# Patient Record
Sex: MALE | Race: WHITE | NOT HISPANIC OR LATINO | Employment: FULL TIME | URBAN - METROPOLITAN AREA
[De-identification: names, ages, dates, MRNs, and addresses within clinical notes are randomized per-mention and may not be internally consistent; named-entity substitution may affect disease eponyms.]

---

## 2017-01-11 ENCOUNTER — TELEPHONE (OUTPATIENT)
Dept: PAIN MEDICINE | Facility: CLINIC | Age: 42
End: 2017-01-11

## 2017-01-12 ENCOUNTER — OFFICE VISIT (OUTPATIENT)
Dept: PAIN MEDICINE | Facility: CLINIC | Age: 42
End: 2017-01-12
Payer: COMMERCIAL

## 2017-01-12 VITALS
SYSTOLIC BLOOD PRESSURE: 156 MMHG | HEART RATE: 67 BPM | WEIGHT: 217.38 LBS | BODY MASS INDEX: 30.43 KG/M2 | RESPIRATION RATE: 18 BRPM | HEIGHT: 71 IN | TEMPERATURE: 98 F | DIASTOLIC BLOOD PRESSURE: 108 MMHG

## 2017-01-12 DIAGNOSIS — F33.1 MODERATE EPISODE OF RECURRENT MAJOR DEPRESSIVE DISORDER: Primary | ICD-10-CM

## 2017-01-12 PROCEDURE — 1159F MED LIST DOCD IN RCRD: CPT | Mod: S$GLB,,, | Performed by: PSYCHIATRY & NEUROLOGY

## 2017-01-12 PROCEDURE — 3080F DIAST BP >= 90 MM HG: CPT | Mod: S$GLB,,, | Performed by: PSYCHIATRY & NEUROLOGY

## 2017-01-12 PROCEDURE — 99204 OFFICE O/P NEW MOD 45 MIN: CPT | Mod: S$GLB,,, | Performed by: PSYCHIATRY & NEUROLOGY

## 2017-01-12 PROCEDURE — 3077F SYST BP >= 140 MM HG: CPT | Mod: S$GLB,,, | Performed by: PSYCHIATRY & NEUROLOGY

## 2017-01-12 PROCEDURE — 99999 PR PBB SHADOW E&M-EST. PATIENT-LVL III: CPT | Mod: PBBFAC,,, | Performed by: PSYCHIATRY & NEUROLOGY

## 2017-01-12 RX ORDER — ESCITALOPRAM OXALATE 10 MG/1
10 TABLET ORAL DAILY
Qty: 30 TABLET | Refills: 1 | Status: SHIPPED | OUTPATIENT
Start: 2017-01-12 | End: 2017-02-18 | Stop reason: SDUPTHER

## 2017-01-12 NOTE — MR AVS SNAPSHOT
[facility]  [address] [place] [address]  Phone: [phone]  Fax: [phone]                  Slava Kamara   2017 12:00 PM   Office Visit    Description:  Male : 1975   Provider:  Dariana Chicas MD   Department:  [facility]           Reason for Visit     Anxiety           Diagnoses this Visit        Comments    Moderate episode of recurrent major depressive disorder    -  Primary            To Do List           Future Appointments        Provider Department Dept Phone    2017 9:20 AM Dariana Chicas MD [facility] [phone]      Goals (5 Years of Data)     None       These Medications        Disp Refills Start End    escitalopram oxalate (LEXAPRO) 10 MG tablet 30 tablet 1 2017    Take 1 tablet (10 mg total) by mouth once daily. - Oral    Pharmacy: [facility]/pharmacy #[id number] - [place], [place] - [address] Ph #: [phone]         [facility] On Call     [facility] On Call Nurse Care Line -  Assistance  Registered nurses in the [facility] On Call Center provide clinical advisement, health education, appointment booking, and other advisory services.  Call for this free service at [phone].             Medications           Message regarding Medications     Verify the changes and/or additions to your medication regime listed below are the same as discussed with your clinician today.  If any of these changes or additions are incorrect, please notify your healthcare provider.        START taking these NEW medications        Refills    escitalopram oxalate (LEXAPRO) 10 MG tablet 1    Sig: Take 1 tablet (10 mg total) by mouth once daily.    Class: Normal    Route: Oral           Verify that the below list of medications is an accurate representation of the medications you are currently taking.  If none reported, the list may be blank. If incorrect, please contact your healthcare provider.  "Carry this list with you in case of emergency.           Current Medications     hydrocodone-acetaminophen 5-325mg (NORCO) 5-325 mg per tablet Take 1 tablet by mouth every 6 (six) hours as needed for Pain.    ondansetron (ZOFRAN-ODT) 8 MG TbDL Take 1 tablet (8 mg total) by mouth every 8 (eight) hours as needed.    oxybutynin (DITROPAN-XL) 10 MG 24 hr tablet Take 1 tablet (10 mg total) by mouth once daily.    amlodipine (NORVASC) 10 MG tablet Take 1 tablet (10 mg total) by mouth once daily.    epinephrine (EPIPEN) 0.3 mg/0.3 mL (1:1,000) AtIn Inject 0.3 mLs (0.3 mg total) into the muscle once.    escitalopram oxalate (LEXAPRO) 10 MG tablet Take 1 tablet (10 mg total) by mouth once daily.           Clinical Reference Information           Vital Signs - Last Recorded  Most recent update: 1/12/2017 12:41 PM by Zeyad Roldan MA    BP Pulse Temp Resp Ht Wt    (!) 156/108 (BP Location: Left arm, Patient Position: Sitting, BP Method: Automatic) 67 97.7 °F (36.5 °C) (Oral) 18 5' 11" (1.803 m) 98.6 kg (217 lb 6 oz)    BMI                30.32 kg/m2          Blood Pressure          Most Recent Value    BP  (!)  156/108      Allergies as of 1/12/2017     Sulfa (Sulfonamide Antibiotics)    Bee Sting [Allergen Ext-venom-honey Bee]      Immunizations Administered on Date of Encounter - 1/12/2017     None      Orders Placed During Today's Visit      Normal Orders This Visit    Ambulatory consult to Psychology       "

## 2017-01-12 NOTE — PROGRESS NOTES
"Outpatient Psychiatry Initial Visit (MD/NP)    1/12/2017    Slava Kamara, a 41 y.o. male, presenting for initial evaluation visit. Met with patient.    Reason for Encounter: self-referral. Patient complains of irritability, anger.        History of Present Illness: Anxiety  Patient is here for evaluation of anxiety.  He has the following anxiety symptoms: difficulty concentrating, feelings of losing control, insomnia, irritable, psychomotor agitation, racing thoughts, shortness of breath. Onset of symptoms was approximately several years ago.  Symptoms have been gradually worsening since that time. He denies current suicidal and homicidal ideation. Family history significant for anxiety.Possible organic causes contributing are: none. Risk factors: negative life event recent move, new job Previous treatment includes none.   He complains of the following medication side effects: none.    Depression  Patient complains of depression. He complains of depressed mood, difficulty concentrating, fatigue, feelings of worthlessness/guilt, insomnia and psychomotor agitation. Onset was approximately in 2007 . That he stayed depressed for a year or moreSymptoms have been gradually worsening since that time. Current symptoms include: depressed mood, difficulty concentrating, fatigue, feelings of worthlessness/guilt and psychomotor agitation. Patient denies recurrent thoughts of death, suicidal attempt, suicidal thoughts with specific plan and suicidal thoughts without plan. Family history significant for anxiety. Possible organic causes contributing are: none. Risk factors: negative life event new job. Previous treatment includes none. He complains of the following side effects from the treatment: none.         Pt reports that he was [place] in [place]. That he grew up with an older sister. That he was raised by both parents till age 8yrs. That his parents then got . That till the age of 8 things went " I think well", " "that they had what they needed, both parents worked. That he does not recall them fighting or quarreling. That when they told him that they were getting  he was angry and surprised. That That his mom had custody and they spent the weekend with their father. That their parents did not seem to have any issues with the divorce. That his mother got remarried 8 moths after the divorce. That his step father was "OK" and they got along well. That he did not do well academically in HS, he was a C , D student. That That he was in [employer]. That he had few friends. That he enjoyed and did well in the  Environment. That he did really well with participating and winning competitions that they enrolled. He completed HS there. That he got  at the age of 16 yrs, had known his gf for 6 months only. That he joined the [employer] in 1994 and moved to [place]. That his twin ( boy and girl) children were born in 1996. That they had problems with their parental styles and that got them arguing them a lot. That they finalized the divorce in 2000. That he signed parental rights over to his wife in 2001. That she got  the same year. That he remarried in July 2002.His wife is 10 yrs older and she has 3 children form prior marriages. 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 That he moved here in 2015 after his wife got a job opportunity. That they live together, and are supportive towards each other. That they no children together.    Review Of Systems:     GENERAL:  No weight gain or loss  SKIN:  No rashes or lacerations  HEAD:  No headaches  EYES:  No exophthalmos, jaundice or blindness  EARS:  No dizziness, tinnitus or hearing loss  NOSE:  No changes in smell  MOUTH & THROAT:  No dyskinetic movements or obvious goiter  CHEST:  No shortness of breath, hyperventilation or cough  CARDIOVASCULAR:  No tachycardia or chest pain  ABDOMEN:  No nausea, vomiting, pain, constipation or " diarrhea  URINARY:  No frequency, dysuria or sexual dysfunction  ENDOCRINE:  No polydipsia, polyuria  MUSCULOSKELETAL:  No pain or stiffness of the joints  NEUROLOGIC:  No weakness, sensory changes, seizures, confusion, memory loss, tremor or other abnormal movements    Current Evaluation:     Nutritional Screening: Considering the patient's height and weight, medications, medical history and preferences, should a referral be made to the dietitian? no    Constitutional  Vitals:  Most recent vital signs, dated less than 90 days prior to this appointment, were reviewed.    There were no vitals filed for this visit.     General:  age appropriate, casually dressed, neatly groomed     Musculoskeletal  Muscle Strength/Tone:  no tremor, no tic   Gait & Station:  non-ataxic     Psychiatric  Speech:  no latency; no press   Mood & Affect:  anxious, depressed, irritable  congruent and appropriate   Thought Process:  normal and logical, goal-directed   Associations:  intact   Thought Content:  normal, no suicidality, no homicidality, delusions, or paranoia   Insight:  intact   Judgement: behavior is adequate to circumstances   Orientation:  grossly intact   Memory: intact for content of interview   Language: grossly intact   Attention Span & Concentration:  able to focus   Fund of Knowledge:  intact and appropriate to age and level of education       Relevant Elements of Neurological Exam: normal gait    Functioning in Relationships:  Spouse/partner:   Peers: few  Employers: [employer]    Laboratory Data  No visits with results within 1 Month(s) from this visit.  Latest known visit with results is:    Office Visit on 10/10/2016   Component Date Value Ref Range Status    Color, UA 10/12/2016 YELLOW   Final    Spec Grav, UA 10/12/2016 1.005   Final    pH, UA 10/12/2016 8   Final    WBC, UA 10/12/2016 ++   Final    Nitrite, UA 10/12/2016 N   Final    Protein, UA 10/12/2016 N   Final    Glucose, UA 10/12/2016 N   Final     Ketones, UA 10/12/2016 N   Final    Urobilinogen, UA 10/12/2016 N   Final    Bilirubin, UA 10/12/2016 N   Final    Blood, UA 10/12/2016 250   Final    Urine Culture, Routine 10/10/2016 No growth   Final         Medications  Outpatient Encounter Prescriptions as of 1/12/2017   Medication Sig Dispense Refill    amlodipine (NORVASC) 10 MG tablet Take 1 tablet (10 mg total) by mouth once daily. 30 tablet 11    epinephrine (EPIPEN) 0.3 mg/0.3 mL (1:1,000) AtIn Inject 0.3 mLs (0.3 mg total) into the muscle once. 0.3 mL 2    hydrocodone-acetaminophen 5-325mg (NORCO) 5-325 mg per tablet Take 1 tablet by mouth every 6 (six) hours as needed for Pain. 20 tablet 0    ondansetron (ZOFRAN-ODT) 8 MG TbDL Take 1 tablet (8 mg total) by mouth every 8 (eight) hours as needed. 12 tablet 1    oxybutynin (DITROPAN-XL) 10 MG 24 hr tablet Take 1 tablet (10 mg total) by mouth once daily. 30 tablet 11     No facility-administered encounter medications on file as of 1/12/2017.            Assessment - Diagnosis - Goals:     Impression: Pt is a 42 Y/O CM with PMHx sig for Hypertension with    Generalized anxiety disorder.  Unspecified depression.        Strengths and Liabilities: Strength: Patient accepts guidance/feedback, Strength: Patient is expressive/articulate., Strength: Patient is intelligent., Strength: Patient is motivated for change., Strength: Patient has positive support network., Strength: Patient has reasonable judgment.    Treatment Goals:  Specify outcomes written in observable, behavioral terms:   Anxiety: acquiring relapse prevention skills, eliminating all anxiety symptoms (SCL-90-R scores in normal range), reducing negative automatic thoughts, reducing physical symptoms of anxiety and reducing time spent worrying (<30 minutes/day)  Depression: acquiring relapse prevention skills, increasing energy, increasing interest in usual activities, increasing motivation, increasing self-reward for positive behaviors  (one/day), increasing self-reward for positive thoughts (one/day), increasing social contacts (three/week) and reducing negative automatic thoughts    Treatment Plan/Recommendations:   · Medication Management: The risks and benefits of medication were discussed with the patient.  · Referral for further treatment to social work team for psychotherapy  · The treatment plan and follow up plan were reviewed with the patient.       Will start on lexapro 10 mg , take half a tab daily for one week and if no side effects increase to one a day.  Have also encouraged ot to make an appointmet with a counsellor to discuss issues with loss of opaternity rights and emotions associated with that.  Also to discuss his move her and issues with his new job, the people and culture at work and to learn better coping skills.  Have also encouraged pt to start making time for himself to do things that he enjoys and work on improving his relationship with his wife.  Discussed benefits of CBT and have discussed the workbook with him  Discussed coping skills.  Provided supportive psychotherapy  Will continue to assess.      Return to Clinic: 1 month    Counseling time: 50%  Total time: 60 minutes.  Consulting clinician was informed of the encounter and consult note.

## 2017-02-13 ENCOUNTER — OFFICE VISIT (OUTPATIENT)
Dept: PAIN MEDICINE | Facility: CLINIC | Age: 42
End: 2017-02-13
Payer: COMMERCIAL

## 2017-02-13 ENCOUNTER — TELEPHONE (OUTPATIENT)
Dept: PAIN MEDICINE | Facility: CLINIC | Age: 42
End: 2017-02-13

## 2017-02-13 VITALS
TEMPERATURE: 98 F | HEART RATE: 71 BPM | HEIGHT: 71 IN | RESPIRATION RATE: 18 BRPM | WEIGHT: 222.69 LBS | SYSTOLIC BLOOD PRESSURE: 150 MMHG | BODY MASS INDEX: 31.18 KG/M2 | DIASTOLIC BLOOD PRESSURE: 101 MMHG

## 2017-02-13 DIAGNOSIS — F41.1 GAD (GENERALIZED ANXIETY DISORDER): Primary | ICD-10-CM

## 2017-02-13 PROCEDURE — 99214 OFFICE O/P EST MOD 30 MIN: CPT | Mod: S$GLB,,, | Performed by: PSYCHIATRY & NEUROLOGY

## 2017-02-13 PROCEDURE — 99999 PR PBB SHADOW E&M-EST. PATIENT-LVL III: CPT | Mod: PBBFAC,,, | Performed by: PSYCHIATRY & NEUROLOGY

## 2017-02-13 PROCEDURE — 3077F SYST BP >= 140 MM HG: CPT | Mod: S$GLB,,, | Performed by: PSYCHIATRY & NEUROLOGY

## 2017-02-13 PROCEDURE — 3080F DIAST BP >= 90 MM HG: CPT | Mod: S$GLB,,, | Performed by: PSYCHIATRY & NEUROLOGY

## 2017-02-13 NOTE — TELEPHONE ENCOUNTER
Patient was seen by Dr. Chicas today for office visit and his Bp was 150/101; pt states he haven't been to sleep since yesterday. Patient states he got off work an came to his appointment and did not take his blood pressure medicine yet. Pt. will take medicine when he return home today.

## 2017-02-13 NOTE — MR AVS SNAPSHOT
[facility]  [address]  [place] [address]  Phone: [phone]  Fax: [phone]                  Slava Kamara   2017 9:20 AM   Office Visit    Description:  Male : 1975   Provider:  Dariana Chicas MD   Department:  [facility]           Reason for Visit     Follow-up                To Do List           Future Appointments        Provider Department Dept Phone    3/27/2017 9:20 AM Dariana Chicas MD [facility] [phone]      Goals (5 Years of Data)     None      [facility]     [facility] Care Line -  Assistance  Registered nurses in the [facility] On Call Center provide clinical advisement, health education, appointment booking, and other advisory services.  Call for this free service at [phone].             Medications           Message regarding Medications     Verify the changes and/or additions to your medication regime listed below are the same as discussed with your clinician today.  If any of these changes or additions are incorrect, please notify your healthcare provider.             Verify that the below list of medications is an accurate representation of the medications you are currently taking.  If none reported, the list may be blank. If incorrect, please contact your healthcare provider. Carry this list with you in case of emergency.           Current Medications     hydrocodone-acetaminophen 5-325mg (NORCO) 5-325 mg per tablet Take 1 tablet by mouth every 6 (six) hours as needed for Pain.    ondansetron (ZOFRAN-ODT) 8 MG TbDL Take 1 tablet (8 mg total) by mouth every 8 (eight) hours as needed.    oxybutynin (DITROPAN-XL) 10 MG 24 hr tablet Take 1 tablet (10 mg total) by mouth once daily.    amlodipine (NORVASC) 10 MG tablet Take 1 tablet (10 mg total) by mouth once daily.    epinephrine (EPIPEN) 0.3 mg/0.3 mL (1:1,000) AtIn Inject 0.3 mLs (0.3 mg total) into the muscle once.     "escitalopram oxalate (LEXAPRO) 10 MG tablet Take 1 tablet (10 mg total) by mouth once daily.           Clinical Reference Information           Your Vitals Were     Pulse Temp Resp Height Weight BMI    71 98.3 °F (36.8 °C) (Oral) 18 5' 11" (1.803 m) 101 kg (222 lb 10.6 oz) 31.06 kg/m2      Allergies as of 2/13/2017     Sulfa (Sulfonamide Antibiotics)    Bee Sting [Allergen Ext-venom-honey Bee]      Immunizations Administered on Date of Encounter - 2/13/2017     None      Language Assistance Services     ATTENTION: Language assistance services are available, free of charge. Please call [phone].      ATENCIÓN: Si timoteola eran, tiene a nelson disposición servicios gratuitos de asistencia lingüística. Llame al [phone].     ROSENDA Ý: N?u b?n nói Ti?ng Vi?t, có các d?ch v? h? tr? ngôn ng? mi?n phí dành cho b?n. G?i s? [phone].         [facility] complies with applicable Federal civil rights laws and does not discriminate on the basis of race, color, national origin, age, disability, or sex.        "

## 2017-02-13 NOTE — PROGRESS NOTES
"Outpatient Psychiatry Follow-Up Visit (MD/NP)    2/13/2017    Clinical Status of Patient:  Outpatient (Ambulatory)    Chief Complaint:  Slava Kamara is a 42 y.o. male who presents today for follow-up of anxiety.  Met with patient.      Interval History and Content of Current Session:  Interim Events/Subjective Report/Content of Current Session: Pt reports that he has been doing " so much better " since his last appointment with me. That he has been taking the lexapro and it is helping him. That he is not having any side effects from it. That he has also been using the coping skills that we have disxussed and reports that he is able to deal with situations at work a lot better. That he has also started to exercise every day and that has been helping him as well. That he and his wife are "OK: though he would like to improve their relationship.     Psychotherapy:  · Target symptoms: anxiety   · Why chosen therapy is appropriate versus another modality: relevant to diagnosis, patient responds to this modality  · Outcome monitoring methods: self-report, observation  · Therapeutic intervention type: behavior modifying psychotherapy, supportive psychotherapy  · Topics discussed/themes: relationships difficulties, work stress, difficulty managing affect in interpersonal relationships, building skills sets for symptom management, symptom recognition  · The patient's response to the intervention is accepting. The patient's progress toward treatment goals is fair.   · Duration of intervention: 30 minutes.    Review of Systems   · PSYCHIATRIC: Pertinant items are noted in the narrative.    Past Medical, Family and Social History: The patient's past medical, family and social history have been reviewed and updated as appropriate within the electronic medical record - see encounter notes.    Compliance: yes    Side effects: None    Risk Parameters:  Patient reports no suicidal ideation  Patient reports no homicidal " "ideation  Patient reports no self-injurious behavior  Patient reports no violent behavior    Exam (detailed: at least 9 elements; comprehensive: all 15 elements)   Constitutional  Vitals:  Most recent vital signs, dated less than 90 days prior to this appointment, were reviewed.   Vitals:    02/13/17 0940   Pulse: 71   Resp: 18   Temp: 98.3 °F (36.8 °C)   TempSrc: Oral   Weight: 101 kg (222 lb 10.6 oz)   Height: 5' 11" (1.803 m)        General:  age appropriate, casually dressed, neatly groomed     Musculoskeletal  Muscle Strength/Tone:  no tremor, no tic   Gait & Station:  non-ataxic     Psychiatric  Speech:  no latency; no press   Mood & Affect:  anxious  congruent and appropriate   Thought Process:  normal and logical, goal-directed   Associations:  intact   Thought Content:  normal, no suicidality, no homicidality, delusions, or paranoia   Insight:  intact   Judgement: behavior is adequate to circumstances   Orientation:  grossly intact   Memory: intact for content of interview   Language: grossly intact   Attention Span & Concentration:  able to focus   Fund of Knowledge:  intact and appropriate to age and level of education     Assessment and Diagnosis   Status/Progress: Based on the examination today, the patient's problem(s) is/are improved.  New problems have been presented today.   Lack of compliance are complicating management of the primary condition.  There are no active rule-out diagnoses for this patient at this time.            Impression: Pt is a 43 Y/O CM with PMHx sig for Hypertension with     Generalized anxiety disorder.  Unspecified depression.           Strengths and Liabilities: Strength: Patient accepts guidance/feedback, Strength: Patient is expressive/articulate., Strength: Patient is intelligent., Strength: Patient is motivated for change., Strength: Patient has positive support network., Strength: Patient has reasonable judgment.     Treatment Goals: Specify outcomes written in " observable, behavioral terms:   Anxiety: acquiring relapse prevention skills, eliminating all anxiety symptoms (SCL-90-R scores in normal range), reducing negative automatic thoughts, reducing physical symptoms of anxiety and reducing time spent worrying (<30 minutes/day)  Depression: acquiring relapse prevention skills, increasing energy, increasing interest in usual activities, increasing motivation, increasing self-reward for positive behaviors (one/day), increasing self-reward for positive thoughts (one/day), increasing social contacts (three/week) and reducing negative automatic thoughts     Treatment Plan/Recommendations:   · Medication Management: The risks and benefits of medication were discussed with the patient.  · Referral for further treatment to social work team for psychotherapy  · The treatment plan and follow up plan were reviewed with the patient.  Will cont  lexapro 10 mg dailly  Also to discuss his move her and issues with his new job, the people and culture at work and to learn better coping skills.  Have also encouraged pt to start making time for himself to do things that he enjoys and work on improving his relationship with his wife.  Discussed benefits of CBT and have discussed the workbook with him  Discussed coping skills.  Provided supportive psychotherapy  Will continue to assess.        Return to Clinic: 1 month

## 2017-02-18 RX ORDER — ESCITALOPRAM OXALATE 10 MG/1
10 TABLET ORAL DAILY
Qty: 30 TABLET | Refills: 1 | Status: SHIPPED | OUTPATIENT
Start: 2017-02-18 | End: 2018-01-03

## 2017-03-27 ENCOUNTER — TELEPHONE (OUTPATIENT)
Dept: PAIN MEDICINE | Facility: CLINIC | Age: 42
End: 2017-03-27

## 2017-11-10 ENCOUNTER — PATIENT OUTREACH (OUTPATIENT)
Dept: INTERNAL MEDICINE | Facility: CLINIC | Age: 42
End: 2017-11-10

## 2017-11-10 NOTE — PROGRESS NOTES
Dear Slava Kamara,     [facility] to your overall [facility].  Our records indicate that you are due for an annual checkup with your primary care provider,  Dr. Kiya Lynn MD.  Please call [phone] to schedule a routine physical exam.  You can also schedule your appointment via your [facility]. You may also be due for the following test and/or procedures:    Health Maintenance Due   Topic Date Due    TETANUS VACCINE  07/11/2017    Influenza Vaccine  08/01/2017       You are more than welcome to schedule your visit with your PCP using our [facility].        If you have had any of the above done at another facility, please let us know by calling [phone]; so that we can update your record.  We will add these results to your chart if you fax them to the fax number listed below.  If you have any questions, please call [phone].    Thanks,               Additional Information  If you have questions, you can email [email or web address] or call [phone]  to talk to our [facility] staff. Remember, [facility] is NOT to be used for urgent needs. For medical emergencies, dial 911.

## 2017-12-20 ENCOUNTER — PATIENT MESSAGE (OUTPATIENT)
Dept: PAIN MEDICINE | Facility: CLINIC | Age: 42
End: 2017-12-20

## 2018-01-02 ENCOUNTER — PATIENT OUTREACH (OUTPATIENT)
Dept: INTERNAL MEDICINE | Facility: CLINIC | Age: 43
End: 2018-01-02

## 2018-01-02 NOTE — PROGRESS NOTES
[facility] is committed to your overall health.  To help you get the most out of each of your visits, we will review your information to make sure you are up to date on all of your recommended tests and/or procedures.       Your PCP  Kiya Lynn MD   found that you may be due for:       Health Maintenance Due   Topic Date Due    TETANUS VACCINE  07/11/2017    Influenza Vaccine  08/01/2017             If you have had any of the above done at another facility, please bring the records or information with you so that your record at [facility] will be complete.  If you would like to schedule any of these, please contact me.     If you are currently taking medication, please bring it with you to your appointment for review.     Also, if you have any type of Advanced Directives, please bring them with you to your office visit so we may scan them into your chart.       Thank you for Choosing [facility] for your healthcare needs.        Additional Information  If you have questions, you can email [email or web address] or call [phone]  to talk to our [facility] staff. Remember, [facility] is NOT to be used for urgent needs. For medical emergencies, dial 911.

## 2018-01-03 ENCOUNTER — OFFICE VISIT (OUTPATIENT)
Dept: INTERNAL MEDICINE | Facility: CLINIC | Age: 43
End: 2018-01-03
Attending: INTERNAL MEDICINE
Payer: COMMERCIAL

## 2018-01-03 VITALS
DIASTOLIC BLOOD PRESSURE: 84 MMHG | BODY MASS INDEX: 31.39 KG/M2 | WEIGHT: 224.19 LBS | HEIGHT: 71 IN | SYSTOLIC BLOOD PRESSURE: 130 MMHG | HEART RATE: 79 BPM | OXYGEN SATURATION: 97 %

## 2018-01-03 DIAGNOSIS — I10 HYPERTENSION, BENIGN: ICD-10-CM

## 2018-01-03 DIAGNOSIS — R06.83 SNORING: ICD-10-CM

## 2018-01-03 DIAGNOSIS — E55.9 VITAMIN D DEFICIENCY: ICD-10-CM

## 2018-01-03 DIAGNOSIS — J30.2 ACUTE SEASONAL ALLERGIC RHINITIS, UNSPECIFIED TRIGGER: ICD-10-CM

## 2018-01-03 DIAGNOSIS — Z00.00 ANNUAL PHYSICAL EXAM: Primary | ICD-10-CM

## 2018-01-03 DIAGNOSIS — R51.9 FREQUENT HEADACHES: ICD-10-CM

## 2018-01-03 DIAGNOSIS — F33.0 MILD EPISODE OF RECURRENT MAJOR DEPRESSIVE DISORDER: ICD-10-CM

## 2018-01-03 DIAGNOSIS — F41.1 GAD (GENERALIZED ANXIETY DISORDER): ICD-10-CM

## 2018-01-03 PROCEDURE — 90686 IIV4 VACC NO PRSV 0.5 ML IM: CPT | Mod: S$GLB,,, | Performed by: INTERNAL MEDICINE

## 2018-01-03 PROCEDURE — 99999 PR PBB SHADOW E&M-EST. PATIENT-LVL V: CPT | Mod: PBBFAC,,, | Performed by: INTERNAL MEDICINE

## 2018-01-03 PROCEDURE — 90471 IMMUNIZATION ADMIN: CPT | Mod: S$GLB,,, | Performed by: INTERNAL MEDICINE

## 2018-01-03 PROCEDURE — 99396 PREV VISIT EST AGE 40-64: CPT | Mod: 25,S$GLB,, | Performed by: INTERNAL MEDICINE

## 2018-01-03 RX ORDER — ESCITALOPRAM OXALATE 10 MG/1
10 TABLET ORAL DAILY
Qty: 30 TABLET | Refills: 1 | Status: SHIPPED | OUTPATIENT
Start: 2018-01-03 | End: 2018-11-14

## 2018-01-03 NOTE — PROGRESS NOTES
Patient given Fluzone IM in the LD. Patient tolerated well and Band-Aid was applied. Lot#[id number] Exp:02/2019. Patient advised to wait in the lobby for 15 min to make sure no adverse reactions occur. Patient states verbal understanding and has no further questions.  Pt has been given vaccine information sheet.

## 2018-01-03 NOTE — PROGRESS NOTES
Subjective:   Patient ID: Slava Kamara is a 42 y.o. male  Chief complaint:   Chief Complaint   Patient presents with    Annual Exam    Headache    Sleeping Problem       HPI  Pt here for annual exam.     Seen by Dr. Lopes in past for renal stone    Followed by Dr. Chicas for anxiety and depression and stopped taking ssri after did not f/u in clinic. lexapro was helpful when took for first month - pt stopped on own as was doing better. 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 No si/hi/ridley    Wife has noticed that he is having inc apneic episodes. He did not f/u with sleep as prior insurance policy would not pay for sleep study. He reports she recorded his episodes for him to view and he is having pauses. Has noticed inc wt gain over past year due to poor diet and lack of exercise. + fatigue and working shift work has been difficult. Having inc frequency of HA located at frontal region. Nonradiating. Achy and pressure in nature.   +sinus congestion. + runny nose and post nasal drip and dry at times. HA Occur any time of day. No numbness or tingling, focal weakness, saddle paraesthesias, gait disturbance, incontinence, dysarthria, dysphagia, facial asymmetry.     Hx of HTN: Not on amlodipine over past > 6 months. When checking has all been < 130/70.     Review of Systems   Constitutional: Negative for activity change and unexpected weight change.   HENT: Negative for hearing loss, rhinorrhea and trouble swallowing.    Eyes: Negative for discharge and visual disturbance.   Respiratory: Negative for chest tightness and wheezing.    Cardiovascular: Negative for chest pain and palpitations.   Gastrointestinal: Negative for blood in stool, constipation, diarrhea and vomiting.   Endocrine: Negative for polydipsia and polyuria.   Genitourinary: Negative for difficulty urinating, hematuria  "and urgency.   Musculoskeletal: Positive for arthralgias and neck pain. Negative for gait problem and neck stiffness.   Skin: Negative for color change and rash.   Neurological: Positive for headaches. Negative for dizziness, facial asymmetry and numbness.   Psychiatric/Behavioral: Positive for dysphoric mood and sleep disturbance. Negative for confusion, hallucinations, self-injury and suicidal ideas.     Objective:  Vitals:    01/03/18 1415   BP: 130/84   Pulse: 79   SpO2: 97%   Weight: 101.7 kg (224 lb 3.3 oz)   Height: 5' 11" (1.803 m)     Body mass index is 31.27 kg/m².    Physical Exam   Constitutional: He is oriented to person, place, and time. He appears well-developed and well-nourished.   HENT:   Head: Normocephalic and atraumatic.   Right Ear: External ear normal.   Left Ear: External ear normal.   Nose: Nose normal.   Mouth/Throat: Oropharynx is clear and moist.   No carotid bruits   Eyes: Conjunctivae and EOM are normal. Pupils are equal, round, and reactive to light.   Neck: Neck supple. No thyromegaly present.   Cardiovascular: Normal rate, regular rhythm, normal heart sounds and intact distal pulses.    Pulmonary/Chest: Effort normal and breath sounds normal.   Abdominal: Soft. Bowel sounds are normal.   Musculoskeletal: He exhibits no edema or tenderness.   Lymphadenopathy:     He has no cervical adenopathy.   Neurological: He is alert and oriented to person, place, and time.   PERRLA, EOMI  No nystagmus  + facial symmetry and sensation in tact  SCM and SS in tact  Tongue and uvula midline  Tongue strength in tact  Light touch in tact bilateral UE and LE  Gait normal  5/5 strength throughout       Skin: Skin is warm and dry. Capillary refill takes less than 2 seconds.   Psychiatric: His behavior is normal. Thought content normal.   Vitals reviewed.      Assessment:  1. Annual physical exam    2. ZACH (generalized anxiety disorder)    3. Mild episode of recurrent major depressive disorder    4. " Snoring    5. Vitamin D deficiency    6. Hypertension, benign    7. Acute seasonal allergic rhinitis, unspecified trigger    8. Frequent headaches        Plan:  Slava was seen today for annual exam, headache and sleeping problem.    Diagnoses and all orders for this visit:    Annual physical exam  -     Vitamin D; Future  -     CBC auto differential; Future  -     Comprehensive metabolic panel; Future  -     Lipid panel; Future  -     Influenza - Quadrivalent (3 years & older) (PF)  Recommend daily sunscreen, cardiovascular exercise min 30 min 5 days per week. Seatbelts routinely.    ZACH (generalized anxiety disorder)  -     Ambulatory Referral to Psychiatry  -     Ambulatory Referral to Psychology  rec resume ssri as was helpful - rtc in 4 weeks for f/u  Refer for counseling.     Mild episode of recurrent major depressive disorder  No si/hi/ridley + depressed mood - resume ssri as above and counseling - consider support group for CG of pt with mental health issues     Snoring  -     Ambulatory consult to Sleep Disorders    Vitamin D deficiency  -     Vitamin D; Future  Not taking supplement, check level    Hypertension, benign  Home bp readings wnl and bp wnl today - cont home monitoring. rec lifestyle mod and wt loss. If persistently > 140/90 he will let me know and will resume Rx    Acute seasonal allergic rhinitis, unspecified trigger  rec nasal saline flushes and flonase x 1 month and zyrtec    Frequent headaches  Suspect multifactorial - allx and clari - rec f/u with sleep clinic for eval of likely clari and rec as above - otc meds discussed. If sx worsen or do not resolve he will let me know. Er and rtc prompts given.     Other orders  -     escitalopram oxalate (LEXAPRO) 10 MG tablet; Take 1 tablet (10 mg total) by mouth once daily.    Give tdap at next appt in 4 weeks for med check  Flu vaccine today    Health Maintenance   Topic Date Due    TETANUS VACCINE  07/11/2017    Influenza Vaccine  08/01/2017     Lipid Panel  08/27/2020

## 2018-01-22 ENCOUNTER — OFFICE VISIT (OUTPATIENT)
Dept: SLEEP MEDICINE | Facility: CLINIC | Age: 43
End: 2018-01-22
Attending: INTERNAL MEDICINE
Payer: COMMERCIAL

## 2018-01-22 VITALS
HEART RATE: 75 BPM | DIASTOLIC BLOOD PRESSURE: 105 MMHG | BODY MASS INDEX: 31.3 KG/M2 | WEIGHT: 223.56 LBS | SYSTOLIC BLOOD PRESSURE: 149 MMHG | HEIGHT: 71 IN

## 2018-01-22 DIAGNOSIS — E66.9 OBESITY (BMI 30.0-34.9): ICD-10-CM

## 2018-01-22 DIAGNOSIS — G47.30 SLEEP APNEA, UNSPECIFIED TYPE: Primary | ICD-10-CM

## 2018-01-22 PROCEDURE — 99203 OFFICE O/P NEW LOW 30 MIN: CPT | Mod: S$GLB,,, | Performed by: NURSE PRACTITIONER

## 2018-01-22 PROCEDURE — 99999 PR PBB SHADOW E&M-EST. PATIENT-LVL III: CPT | Mod: PBBFAC,,, | Performed by: NURSE PRACTITIONER

## 2018-01-22 NOTE — PROGRESS NOTES
"Slava Kamara  was seen as a new patient at the request of  Kiya Lynn MD for the evaluation of obstructive sleep apnea.    CHIEF COMPLAINT:    Chief Complaint   Patient presents with    Sleep Apnea       01/22/2018 LISSETT Potter NP: Initial HISTORY OF PRESENT ILLNESS: Slava Kamara is a 42 y.o. male is here for sleep evaluation.       Per PCP, Dr. Lynn" "Wife has noticed that he is having inc apneic episodes. He did not f/u with sleep as prior insurance policy would not pay for sleep study. He reports she recorded his episodes for him to view and he is having pauses. Has noticed inc wt gain over past year due to poor diet and lack of exercise. + fatigue and working shift work has been difficult".    Patient complaints include: disruptive snoring, interrupted sleep, and witnessed apneas. Nocturia x 3 - 4.    " I heard recording of my snoring and it didn't sound human"    Denies symptoms of restless legs or kicking during sleep.    Occupation:  [employer], 5 pm to 5 am, 7 days on 7 days off    Sebring Sleepiness Scale score during initial sleep evaluation was 16.    SLEEP ROUTINE:    Bed partner:  wife  Time to bed:  If working by 7 am, if off ~ 2 - 3 am   Sleep onset latency: immediately   Disruptions or awakenings:    3 - 4   Wakeup time:      9: 30 am regardless if he worked the night before   Perceived sleep quality:  poor      PAST MEDICAL HISTORY:    Active Ambulatory Problems     Diagnosis Date Noted    Nephrolithiasis 09/02/2016    Snoring 09/02/2016    Acute seasonal allergic rhinitis 01/03/2018    Vitamin D deficiency 01/03/2018    Mild episode of recurrent major depressive disorder 01/03/2018    ZACH (generalized anxiety disorder) 01/03/2018     Resolved Ambulatory Problems     Diagnosis Date Noted    Hypertension 09/02/2016     Past Medical History:   Diagnosis Date    Anxiety     Depression     Hypertension     Kidney stones     Sciatica     Snoring " "                PAST SURGICAL HISTORY:    Past Surgical History:   Procedure Laterality Date    APPENDECTOMY      CYSTOSCOPY W/ LASER LITHOTRIPSY      EXTRACORPOREAL SHOCK WAVE LITHOTRIPSY  1996, 10/4/2016    KNEE SURGERY           FAMILY HISTORY:                Family History   Problem Relation Age of Onset    Heart disease Mother     Stroke Mother     Heart attack Mother     Heart disease Father     Stroke Father     Heart attack Father        SOCIAL HISTORY:          Tobacco:   History   Smoking Status    Never Smoker   Smokeless Tobacco    Never Used       Alcohol use:    History   Alcohol Use No     Comment: rare                 ALLERGIES:    Review of patient's allergies indicates:   Allergen Reactions    Sulfa (sulfonamide antibiotics) Anaphylaxis    Bee sting [allergen ext-venom-honey bee]        CURRENT MEDICATIONS:    Current Outpatient Prescriptions   Medication Sig Dispense Refill    escitalopram oxalate (LEXAPRO) 10 MG tablet Take 1 tablet (10 mg total) by mouth once daily. 30 tablet 1    epinephrine (EPIPEN) 0.3 mg/0.3 mL (1:1,000) AtIn Inject 0.3 mLs (0.3 mg total) into the muscle once. 0.3 mL 2     No current facility-administered medications for this visit.                   REVIEW OF SYSTEMS:     Sleep related symptoms as per HPI.  CONST:Denies weight gain    HEENT: Reports sinus congestion  PULM: Reports dyspnea  CARD:  Reports palpitations   GI:  Reports acid reflux  : Denies polyuria  NEURO: Reports morning headaches  PSYCH: Reports mood disturbance  HEME: Denies anemia    Otherwise, a balance of systems reviewed is negative.          PHYSICAL EXAM:  Vitals:    01/22/18 0804   BP: (!) 149/105   Pulse: 75   Weight: 101.4 kg (223 lb 8.7 oz)   Height: 5' 11" (1.803 m)   PainSc: 0-No pain     Body mass index is 31.18 kg/m².     GENERAL: Overweight development, well groomed  HEENT:  Conjunctivae are non-erythematous; Pupils equal, round, and reactive to light; Nose is symmetrical; " Nasal mucosa is pink and moist; Septum is midline; Inferior turbinates are normal; Nasal airflow is normal; Posterior pharynx is pink; Modified Mallampati: IV; Posterior palate is normal; Tonsils +1; Uvula is normal and pink;Tongue is normal; Dentition is fair; No TMJ tenderness; Jaw opening and protrusion without click and without discomfort.  NECK: Supple. Neck circumference is 14 inches. No thyromegaly. No palpable nodes.    SKIN: On face and neck: No abrasions, no rashes, no lesions.  No subcutaneous nodules are palpable.  RESPIRATORY: Chest is clear to auscultation.  Normal chest expansion and non-labored breathing at rest.  CARDIOVASCULAR: Normal S1, S2.  No murmurs, gallops or rubs. No carotid bruits bilaterally.  EXTREMITIES: No edema. No clubbing. No cyanosis. Station normal. Gait normal.        NEURO/PSYCH: Oriented to time, place and person. Normal attention span and concentration. Affect is full. Mood is normal.                                              ASSESSMENT:    Sleep apnea, unspecified. The patient symptomatically has snoring, interrupted sleep, and witnessed apneas with findings of crowded oral airway and elevated body mas index. Medical co-morbidities: depression, anxiety, and obesity. This warrants further investigation for possible obstructive sleep apnea.      Obesity, BMI > 30, discussed relationship between HARJIT and weight    Shiftworker    PLAN:    Diagnostic: HST. The nature of this procedure and its indication was discussed with the patient. Discussed with patient that if HST is negative or depending on severity of positive HST, in-lab sleep study may be necessary.  Patient will be contacted after sleep study is done. RTC to discuss results    Education: During our discussion today, we talked about the etiology of obstructive sleep apnea as well as the potential ramifications of untreated sleep apnea, which could include daytime sleepiness, hypertension, heart disease and/or stroke. We  discussed potential treatment options, which could include weight loss, body positioning, continuous positive airway pressure (CPAP), OA, EPAP, or referral for surgical consideration.     Precautions: The patient was advised to abstain from driving should they feel sleepy  or drowsy.     Thank you for allowing me the opportunity to participate in the care of your patient.

## 2018-01-22 NOTE — PATIENT INSTRUCTIONS
Malena or Leif will contact you to schedule your sleep study. Their number is [phone] (ext 2). The [facility] is located on 7th floor of the [facility].    We will call you when the sleep study results are ready - if you have not heard from us by 2 weeks from the date of the study, please call [phone] (ext 1).    You are advised to abstain from driving should you feel sleepy or drowsy.

## 2018-03-07 ENCOUNTER — TELEPHONE (OUTPATIENT)
Dept: SLEEP MEDICINE | Facility: OTHER | Age: 43
End: 2018-03-07

## 2018-03-13 ENCOUNTER — TELEPHONE (OUTPATIENT)
Dept: SLEEP MEDICINE | Facility: OTHER | Age: 43
End: 2018-03-13

## 2018-03-14 ENCOUNTER — TELEPHONE (OUTPATIENT)
Dept: SLEEP MEDICINE | Facility: OTHER | Age: 43
End: 2018-03-14

## 2018-03-16 ENCOUNTER — TELEPHONE (OUTPATIENT)
Dept: SLEEP MEDICINE | Facility: OTHER | Age: 43
End: 2018-03-16

## 2018-03-19 ENCOUNTER — HOSPITAL ENCOUNTER (OUTPATIENT)
Dept: SLEEP MEDICINE | Facility: OTHER | Age: 43
Discharge: HOME | End: 2018-03-19
Attending: NURSE PRACTITIONER
Payer: COMMERCIAL

## 2018-03-19 DIAGNOSIS — G47.33 OSA (OBSTRUCTIVE SLEEP APNEA): ICD-10-CM

## 2018-03-19 DIAGNOSIS — G47.30 SLEEP APNEA, UNSPECIFIED TYPE: ICD-10-CM

## 2018-03-19 PROCEDURE — 95800 SLP STDY UNATTENDED: CPT | Mod: 26,,, | Performed by: PSYCHIATRY & NEUROLOGY

## 2018-03-19 PROCEDURE — 95800 SLP STDY UNATTENDED: CPT

## 2018-03-19 NOTE — PROGRESS NOTES
device before going to bed tonight.  I sized the device and showed the patient using a mirror how the device fits and what it should look like so they can use a mirror when putting it o Per physician orders, patient was given home sleep testing device and instructed on how to apply the n themselves at home.  We reviewed the instruction booklet and the number to call if they have any questions at night.  Patient understood and was instructed to return the device the next back to the sleep lab.

## 2018-03-26 ENCOUNTER — TELEPHONE (OUTPATIENT)
Dept: SLEEP MEDICINE | Facility: CLINIC | Age: 43
End: 2018-03-26

## 2018-03-26 NOTE — TELEPHONE ENCOUNTER
Please notify pt that 03/19/2018 home sleep test results available. Schedule for f/u to review results.

## 2018-11-14 ENCOUNTER — TELEPHONE (OUTPATIENT)
Dept: INTERNAL MEDICINE | Facility: CLINIC | Age: 43
End: 2018-11-14

## 2018-11-14 ENCOUNTER — HOSPITAL ENCOUNTER (OUTPATIENT)
Dept: RADIOLOGY | Facility: OTHER | Age: 43
Discharge: HOME | End: 2018-11-14
Attending: INTERNAL MEDICINE
Payer: COMMERCIAL

## 2018-11-14 ENCOUNTER — OFFICE VISIT (OUTPATIENT)
Dept: INTERNAL MEDICINE | Facility: CLINIC | Age: 43
End: 2018-11-14
Attending: INTERNAL MEDICINE
Payer: COMMERCIAL

## 2018-11-14 VITALS
HEIGHT: 70 IN | OXYGEN SATURATION: 98 % | HEART RATE: 82 BPM | SYSTOLIC BLOOD PRESSURE: 138 MMHG | DIASTOLIC BLOOD PRESSURE: 92 MMHG | BODY MASS INDEX: 31.78 KG/M2 | WEIGHT: 222 LBS

## 2018-11-14 DIAGNOSIS — G47.33 OSA (OBSTRUCTIVE SLEEP APNEA): ICD-10-CM

## 2018-11-14 DIAGNOSIS — Z87.442 HISTORY OF NEPHROLITHIASIS: ICD-10-CM

## 2018-11-14 DIAGNOSIS — R19.7 DIARRHEA, UNSPECIFIED TYPE: ICD-10-CM

## 2018-11-14 DIAGNOSIS — R10.32 LEFT LOWER QUADRANT PAIN: ICD-10-CM

## 2018-11-14 DIAGNOSIS — R10.9 LEFT FLANK PAIN: ICD-10-CM

## 2018-11-14 DIAGNOSIS — N20.0 RENAL STONES: Primary | ICD-10-CM

## 2018-11-14 DIAGNOSIS — R10.32 LEFT LOWER QUADRANT PAIN: Primary | ICD-10-CM

## 2018-11-14 DIAGNOSIS — I10 ESSENTIAL HYPERTENSION: ICD-10-CM

## 2018-11-14 LAB
BILIRUB SERPL-MCNC: NORMAL MG/DL
BLOOD URINE, POC: NORMAL
COLOR, POC UA: YELLOW
GLUCOSE UR QL STRIP: NORMAL
KETONES UR QL STRIP: NORMAL
LEUKOCYTE ESTERASE URINE, POC: NORMAL
NITRITE, POC UA: NORMAL
PH, POC UA: 5
PROTEIN, POC: NORMAL
SPECIFIC GRAVITY, POC UA: 1.02
UROBILINOGEN, POC UA: NORMAL

## 2018-11-14 PROCEDURE — 74178 CT ABD&PLV WO CNTR FLWD CNTR: CPT | Mod: 26,,, | Performed by: RADIOLOGY

## 2018-11-14 PROCEDURE — 25500020 PHARM REV CODE 255: Performed by: INTERNAL MEDICINE

## 2018-11-14 PROCEDURE — 3008F BODY MASS INDEX DOCD: CPT | Mod: CPTII,S$GLB,, | Performed by: INTERNAL MEDICINE

## 2018-11-14 PROCEDURE — 81001 URINALYSIS AUTO W/SCOPE: CPT | Mod: S$GLB,,, | Performed by: INTERNAL MEDICINE

## 2018-11-14 PROCEDURE — 3075F SYST BP GE 130 - 139MM HG: CPT | Mod: CPTII,S$GLB,, | Performed by: INTERNAL MEDICINE

## 2018-11-14 PROCEDURE — 99999 PR PBB SHADOW E&M-EST. PATIENT-LVL III: CPT | Mod: PBBFAC,,, | Performed by: INTERNAL MEDICINE

## 2018-11-14 PROCEDURE — 99214 OFFICE O/P EST MOD 30 MIN: CPT | Mod: 25,S$GLB,, | Performed by: INTERNAL MEDICINE

## 2018-11-14 PROCEDURE — 74178 CT ABD&PLV WO CNTR FLWD CNTR: CPT | Mod: TC

## 2018-11-14 PROCEDURE — 3080F DIAST BP >= 90 MM HG: CPT | Mod: CPTII,S$GLB,, | Performed by: INTERNAL MEDICINE

## 2018-11-14 RX ORDER — AMLODIPINE BESYLATE 10 MG/1
10 TABLET ORAL DAILY
Qty: 90 TABLET | Refills: 0 | Status: SHIPPED | OUTPATIENT
Start: 2018-11-14 | End: 2022-06-27

## 2018-11-14 RX ADMIN — IOHEXOL 100 ML: 350 INJECTION, SOLUTION INTRAVENOUS at 10:11

## 2018-11-14 RX ADMIN — IOHEXOL 30 ML: 350 INJECTION, SOLUTION INTRAVENOUS at 10:11

## 2018-11-14 NOTE — TELEPHONE ENCOUNTER
Message sent to pt via my chart with results and updates to plan.     Unable to call and speak with pt as number in chart has been disconnected   - please arrange stool studies   - please arrange urology appt asap in 1 week if possible

## 2018-11-14 NOTE — PROGRESS NOTES
Subjective:   Patient ID: Slava Kamara is a 43 y.o. male  Chief complaint:   Chief Complaint   Patient presents with    Flank Pain     left with diarrhea       HPI     Pt here for UC appt   Has hx of nephrolithiasis - last was 2 years ago     Started with left flank pain c/w pain from renal stone in past and inc fluids/cranberry juice to help pass x 4 weeks - no improvement   Pain is achy, stabbing, throbbing and radiates to left lower quad which is - dull and achy x 1 month   Urine ranges from clear to brown color - no brb in urine   No blood clots in urine   Urine is cloudy    Then started with loose stools at same time as pain - loose brown  No blood in stool   Occurs 1 hour after eating   No formed stools over the past month   No recent antibiotics   No known sick contacts  No nausea or emesis   No fevers or chills      No pain with defecation, testicular pain or discharge    No changes in diet preceding sx above   No foreign travel     HTN: off of amlodipine for period of time as was prev diet controlled - over past few months persistently was 150s/    Larry: never f/u with sleep - agrees to do so     Review of Systems   Constitutional: Negative for chills and fever.   HENT: Negative for rhinorrhea and sore throat.    Eyes: Negative for pain and visual disturbance.   Respiratory: Negative for cough, shortness of breath and wheezing.    Cardiovascular: Negative for chest pain and palpitations.   Gastrointestinal: Positive for abdominal pain and diarrhea. Negative for anal bleeding, blood in stool, constipation, nausea, rectal pain and vomiting.   Genitourinary: Negative for discharge, dysuria, hematuria, penile pain, penile swelling, scrotal swelling and testicular pain.   Musculoskeletal: Positive for back pain. Negative for joint swelling.   Skin: Negative for color change and rash.   Neurological: Negative for dizziness and headaches.   Psychiatric/Behavioral: Negative for sleep disturbance. The  "patient is not nervous/anxious.      Objective:  Vitals:    11/14/18 0759   BP: (!) 138/92   BP Location: Left arm   Patient Position: Sitting   BP Method: Large (Manual)   Pulse: 82   SpO2: 98%   Weight: 100.7 kg (222 lb 0.1 oz)   Height: 5' 10" (1.778 m)     Body mass index is 31.85 kg/m².    Physical Exam   Constitutional: He is oriented to person, place, and time. He appears well-developed and well-nourished.   HENT:   Head: Normocephalic and atraumatic.   Eyes: Conjunctivae and EOM are normal.   Neck: Neck supple.   Cardiovascular: Normal rate, regular rhythm and intact distal pulses.   Pulmonary/Chest: Effort normal and breath sounds normal.   Abdominal: Soft. Bowel sounds are normal. He exhibits no distension and no mass. There is tenderness. There is no guarding. No hernia.   + left CVA ttp  + left lower quad with ttp    Musculoskeletal: He exhibits no edema or tenderness.   Lymphadenopathy:     He has no cervical adenopathy.   Neurological: He is alert and oriented to person, place, and time.   Skin: Skin is warm and dry.   Psychiatric: He has a normal mood and affect. His behavior is normal. Judgment and thought content normal.   Vitals reviewed.    Assessment:  1. Left lower quadrant pain    2. Left flank pain    3. Diarrhea, unspecified type    4. History of nephrolithiasis    5. HARJIT (obstructive sleep apnea)    6. Essential hypertension        Plan:  Slava was seen today for flank pain.    Diagnoses and all orders for this visit:    Left lower quadrant pain  -     CT Abdomen Pelvis W Wo Contrast; Future    Left flank pain  -     POCT urinalysis, dipstick or tablet reag  -     Urinalysis; Future  -     Urine culture; Future  -     CT Abdomen Pelvis W Wo Contrast; Future  poct urine wnl   F/u urine studies, labs, ct     Diarrhea, unspecified type  -     CT Abdomen Pelvis W Wo Contrast; Future  -     TSH; Future  Et unclear - r/o colitis as pot cause - if neg will order stool studies   Kimble diet   No " caffeine or dairy   Er and rtc prompts given      History of nephrolithiasis  -     CT Abdomen Pelvis W Wo Contrast; Future    HARJIT (obstructive sleep apnea)  Counseled pt to f/u with sleep for harjit/cpap     Essential hypertension  -     amLODIPine (NORVASC) 10 MG tablet; Take 1 tablet (10 mg total) by mouth once daily.  - resume amlodipine, low salt diet, reg exercise    et of sx unclear - consider renal stone, pyelo, colitis - if CT unremarkable will order stool studies and refer to GI if studies neg    HTN: resume amlodipine - rtc in 2 for bp check - schedule for annual     Health Maintenance   Topic Date Due    TETANUS VACCINE  07/11/2017    Influenza Vaccine  08/01/2018    Lipid Panel  08/27/2020

## 2018-11-14 NOTE — TELEPHONE ENCOUNTER
Printed out and put in the mail 2 letters to notify this pt that he can to his fitkit at home and that he has appt with urology 11/26/18 at 200 pm

## 2018-11-15 ENCOUNTER — TELEPHONE (OUTPATIENT)
Dept: INTERNAL MEDICINE | Facility: CLINIC | Age: 43
End: 2018-11-15

## 2018-11-15 DIAGNOSIS — E55.9 VITAMIN D DEFICIENCY: Primary | ICD-10-CM

## 2018-11-15 RX ORDER — ACETAMINOPHEN 500 MG
1 TABLET ORAL DAILY
COMMUNITY
Start: 2018-11-15 | End: 2018-11-26

## 2018-11-15 RX ORDER — ERGOCALCIFEROL 1.25 MG/1
50000 CAPSULE ORAL
Qty: 12 CAPSULE | Refills: 0 | Status: SHIPPED | OUTPATIENT
Start: 2018-11-15 | End: 2018-11-26

## 2018-11-15 NOTE — TELEPHONE ENCOUNTER
Message sent to pt via my chart with lab results and updates to plan.     will arrange vit d in 3 months when pt comes for annual appt in [place]

## 2018-11-16 RX ORDER — TAMSULOSIN HYDROCHLORIDE 0.4 MG/1
0.4 CAPSULE ORAL DAILY
Qty: 30 CAPSULE | Refills: 1 | Status: SHIPPED | OUTPATIENT
Start: 2018-11-16 | End: 2018-11-26

## 2018-11-26 ENCOUNTER — OFFICE VISIT (OUTPATIENT)
Dept: UROLOGY | Facility: CLINIC | Age: 43
End: 2018-11-26
Attending: INTERNAL MEDICINE
Payer: COMMERCIAL

## 2018-11-26 VITALS
BODY MASS INDEX: 31.78 KG/M2 | HEIGHT: 70 IN | DIASTOLIC BLOOD PRESSURE: 100 MMHG | WEIGHT: 222 LBS | HEART RATE: 76 BPM | SYSTOLIC BLOOD PRESSURE: 146 MMHG

## 2018-11-26 DIAGNOSIS — N20.0 NEPHROLITHIASIS: Primary | ICD-10-CM

## 2018-11-26 LAB
BILIRUB SERPL-MCNC: ABNORMAL MG/DL
BLOOD URINE, POC: ABNORMAL
COLOR, POC UA: YELLOW
GLUCOSE UR QL STRIP: ABNORMAL
KETONES UR QL STRIP: ABNORMAL
LEUKOCYTE ESTERASE URINE, POC: ABNORMAL
NITRITE, POC UA: ABNORMAL
PH, POC UA: 5
PROTEIN, POC: ABNORMAL
SPECIFIC GRAVITY, POC UA: 1.01
UROBILINOGEN, POC UA: ABNORMAL

## 2018-11-26 PROCEDURE — 81002 URINALYSIS NONAUTO W/O SCOPE: CPT | Mod: S$GLB,,, | Performed by: UROLOGY

## 2018-11-26 PROCEDURE — 3077F SYST BP >= 140 MM HG: CPT | Mod: CPTII,S$GLB,, | Performed by: UROLOGY

## 2018-11-26 PROCEDURE — 3008F BODY MASS INDEX DOCD: CPT | Mod: CPTII,S$GLB,, | Performed by: UROLOGY

## 2018-11-26 PROCEDURE — 3080F DIAST BP >= 90 MM HG: CPT | Mod: CPTII,S$GLB,, | Performed by: UROLOGY

## 2018-11-26 PROCEDURE — 99214 OFFICE O/P EST MOD 30 MIN: CPT | Mod: 25,S$GLB,, | Performed by: UROLOGY

## 2018-11-26 RX ORDER — EPINEPHRINE 0.3 MG/.3ML
INJECTION SUBCUTANEOUS ONCE
COMMUNITY
End: 2022-06-27

## 2018-11-26 NOTE — PROGRESS NOTES
"Subjective:      Slava Kamara is a 43 y.o. male who returns today regarding his nephrolithiasis.    He has long h/o stones. Last s/p bilateral URS in 2016. Left lower pole stone on CT at that time was not visualized with ureteroscope. He hasn't passed a stone since then.    For the past couple of weeks he has had recurrent left lower back pain, sometimes TTP. No n/v or hematuria.    The following portions of the patient's history were reviewed and updated as appropriate: allergies, current medications, past family history, past medical history, past social history, past surgical history and problem list.    Review of Systems  A comprehensive multipoint review of systems was negative except as otherwise stated in the HPI.     Objective:   Vitals:   BP (!) 146/100 (BP Location: Left arm, Patient Position: Sitting, BP Method: Large (Automatic))   Pulse 76   Ht 5' 10" (1.778 m)   Wt 100.7 kg (222 lb 0.1 oz)   BMI 31.85 kg/m²     Physical Exam   General: alert and oriented, no acute distress  Respiratory: Symmetric expansion, non-labored breathing  CV: RRR  Abd: Soft, NT, ND  Back: Mildly TTP in left lower back; no CVAT  Skin: No rashes or suspicious lesions noted  Neuro: no gross deficits  Psych: normal judgment and insight, normal mood/affect and non-anxious    Lab Review   Urinalysis demonstrates positive for leukocytes, red blood cells  Lab Results   Component Value Date    WBC 9.67 11/14/2018    HGB 15.0 11/14/2018    HCT 44.8 11/14/2018    MCV 82 11/14/2018     11/14/2018     Lab Results   Component Value Date    CREATININE 1.0 11/14/2018    BUN 18 11/14/2018       Imaging (all images personally reviewed; agree with report below)  Results for orders placed during the hospital encounter of 11/14/18   CT Abdomen Pelvis W Wo Contrast    Narrative EXAMINATION:  CT ABDOMEN PELVIS W WO CONTRAST    CLINICAL HISTORY:  left lower back pain, left lower quad pain, hx of renal stones, diarrhea x 1 month " with pain - eval for causes of symptoms;Left lower quadrant pain    TECHNIQUE:  Low dose axial images, sagittal and coronal reformations were obtained from the lung bases to the pubic symphysis before and following the IV administration of 100 mL of Omnipaque 350.  30 mL of oral Omnipaque 350 was also administered.    COMPARISON:  09/15/2016    FINDINGS:  There is no consolidation in the visualized lungs.  Few dependent opacities in the lower lobe suggesting scarring or atelectasis.  Continued hiatal hernia.    The liver is enlarged measuring 18 cm in craniocaudal dimension.  There is geographic decreased attenuation of the liver concerning for fatty infiltration.  The spleen and pancreas are normal in size without focal lesions.  No calcified gallstones in the gallbladder by CT criteria.    The adrenal glands are within normal limits bilaterally.    The kidneys are normal in size measuring approximately 10 cm in length bilaterally.    1.5 cm fluid density lesion in the upper pole right kidney compatible with a cyst with Hounsfield units of 10 precontrast and 14 postcontrast    Prominent calcified nonobstructive stone in the left lower renal calyces measuring approximately 1 cm slightly increased from prior.  No evidence for obstructive uropathy.  Punctate nonobstructive right renal caliceal stone measuring 1 mm.  Previous obstructing stone in the right proximal ureter is no longer seen.  No ureteral calculus or hydronephrosis with uptake and excretion of contrast bilaterally.    The appendix is not seen and likely has been removed no secondary signs to suggest acute appendicitis.    Questionable thickening of the terminal ileum versus artifact from peristalsis.    Contrast extending throughout the small bowel and into the colon no focal dilated loop of bowel to suggest bowel obstruction.  No aneurysmal dilatation of the abdominal aorta.  Right inguinal hernia containing fat similar to prior.    No evidence for  acute fracture or subluxation visualized spine heterogeneous sclerosis and lucency of the S1 vertebra and right sacral ala suggestive for hemangioma unchanged suggestive for underlying hemangioma..      Impression No acute intra-abdominal findings.    Left-sided nonobstructive renal caliceal stone.    Punctate nonobstructive right renal caliceal stone    Small right upper pole renal cyst.    Continued right inguinal hernia containing fat    Appendix not seen and likely has been removed no secondary signs to suggest acute appendicitis.    Questionable thickening of the terminal ileum versus artifact from peristalsis clinical correlation and further evaluation as warranted.    Please see above for additional details.      Electronically signed by: Robinson De La Cruz DO  Date:    11/14/2018  Time:    11:00          Assessment and Plan:   1. Nephrolithiasis  -- Stone on CT now is similar to 2016, which could not be visualized with ureteroscope and likely represents a parenchymal calcification. Regardless, renal stone would not cause flank pain.  -- Based on above, I don't recommend any intervention right now  -- Suspect MSK etiology for pain - will FU w/ PCP

## 2018-11-26 NOTE — LETTER
November 26, 2018      Kiya Lynn MD  [address]  [facility] [address]           [facility]  [address] [place] [address], [facility] [address]  [facility] [address]  Phone: [phone]  Fax: [phone]          Patient: Slava Kamara   MR Number: [id number]   YOB: 1975   Date of Visit: 11/26/2018       Dear Dr. Kiya Lynn:    Thank you for referring Slava Kamara to me for evaluation. Attached you will find relevant portions of my assessment and plan of care.    If you have questions, please do not hesitate to call me. I look forward to following Slava Kamara along with you.    Sincerely,    Shelton Solis MD    Enclosure  CC:  No Recipients    If you would like to receive this communication electronically, please contact [email or web address] or [phone] to request more information on [employer] Link access.    For providers and/or their staff who would like to refer a patient to [facility], please contact us through our one-stop-shop provider referral line, [facility], at [phone].    If you feel you have received this communication in error or would no longer like to receive these types of communications, please e-mail [email or web address]

## 2019-02-26 ENCOUNTER — PATIENT MESSAGE (OUTPATIENT)
Dept: SLEEP MEDICINE | Facility: CLINIC | Age: 44
End: 2019-02-26

## 2019-03-25 ENCOUNTER — PATIENT MESSAGE (OUTPATIENT)
Dept: INTERNAL MEDICINE | Facility: CLINIC | Age: 44
End: 2019-03-25

## 2019-03-25 ENCOUNTER — TELEPHONE (OUTPATIENT)
Dept: INTERNAL MEDICINE | Facility: CLINIC | Age: 44
End: 2019-03-25

## 2019-03-25 DIAGNOSIS — K63.9 COLONIC THICKENING: Primary | ICD-10-CM

## 2019-03-25 NOTE — TELEPHONE ENCOUNTER
Called pt and LVM stating PCP rec and further updates to plan   Left office number for pt to return call and also stated that message has been sent via my chart

## 2019-03-25 NOTE — TELEPHONE ENCOUNTER
Called and lmomv and send email   Pt overdue for f/u with me - please arrange appt   rec to f/u gastroenterology - please arrange

## 2019-03-27 ENCOUNTER — TELEPHONE (OUTPATIENT)
Dept: INTERNAL MEDICINE | Facility: CLINIC | Age: 44
End: 2019-03-27

## 2019-03-27 NOTE — TELEPHONE ENCOUNTER
Left voice message for patient to schedule appointment from referral to Gastroenterology Clinic.  Aleksandar BRENNER  [phone]

## 2020-10-05 ENCOUNTER — PATIENT MESSAGE (OUTPATIENT)
Dept: ADMINISTRATIVE | Facility: HOSPITAL | Age: 45
End: 2020-10-05

## 2021-04-26 ENCOUNTER — PATIENT MESSAGE (OUTPATIENT)
Dept: RESEARCH | Facility: HOSPITAL | Age: 46
End: 2021-04-26

## 2021-06-04 ENCOUNTER — TELEPHONE (OUTPATIENT)
Dept: INTERNAL MEDICINE | Facility: CLINIC | Age: 46
End: 2021-06-04

## 2021-10-05 ENCOUNTER — PATIENT MESSAGE (OUTPATIENT)
Dept: ADMINISTRATIVE | Facility: HOSPITAL | Age: 46
End: 2021-10-05

## 2022-06-07 ENCOUNTER — PATIENT OUTREACH (OUTPATIENT)
Dept: ADMINISTRATIVE | Facility: HOSPITAL | Age: 47
End: 2022-06-07
Payer: COMMERCIAL

## 2022-06-07 ENCOUNTER — PATIENT MESSAGE (OUTPATIENT)
Dept: ADMINISTRATIVE | Facility: HOSPITAL | Age: 47
End: 2022-06-07
Payer: COMMERCIAL

## 2022-06-07 NOTE — PROGRESS NOTES
2022 Care Everywhere updates requested and reviewed.  Immunizations reconciled. Media reports reviewed.  Duplicate HM overrides and  orders removed.  Overdue HM topic chart audit and/or requested.  Overdue lab testing linked to upcoming lab appointments if applies.    WOG orders placed. NEW PATIENT      Health Maintenance Due   Topic Date Due    Hepatitis C Screening  Never done    COVID-19 Vaccine (1) Never done    HIV Screening  Never done    TETANUS VACCINE  2017    Colorectal Cancer Screening  Never done

## 2022-06-07 NOTE — LETTER
Radha 15, 2022    Slava Kamara  [address] [place] [id number]             [facility]  [address]  [facility] [address]  Phone: [phone] Dear Slava,     We have tried to reach you by [facility] email unsuccessfully.    [facility] is committed to your overall health.  To help you get the most out of each of your visits, we will review your information to make sure you are up to date on all of your recommended tests and/or procedures.       As a new patient to David Hannah MD , we may not have your complete medical records.  After reviewing your chart have found that you may be due for the following:         Health Maintenance Due   Topic    Hepatitis C Screening     COVID-19 Vaccine (1)    HIV Screening     TETANUS VACCINE     Colorectal Cancer Screening          If you have had any of the above done at another facility, please bring the records or information with you so that your record at [facility] will be complete.       If you are currently taking medication, please bring it with you to your appointment for review.     Thank you for letting us care for you,        Your [facility] Team

## 2022-06-27 ENCOUNTER — OFFICE VISIT (OUTPATIENT)
Dept: FAMILY MEDICINE | Facility: CLINIC | Age: 47
End: 2022-06-27
Payer: COMMERCIAL

## 2022-06-27 ENCOUNTER — LAB VISIT (OUTPATIENT)
Dept: LAB | Facility: HOSPITAL | Age: 47
End: 2022-06-27
Attending: FAMILY MEDICINE
Payer: COMMERCIAL

## 2022-06-27 VITALS
RESPIRATION RATE: 18 BRPM | OXYGEN SATURATION: 99 % | HEART RATE: 80 BPM | HEIGHT: 70 IN | DIASTOLIC BLOOD PRESSURE: 82 MMHG | SYSTOLIC BLOOD PRESSURE: 146 MMHG | WEIGHT: 217.81 LBS | BODY MASS INDEX: 31.18 KG/M2

## 2022-06-27 DIAGNOSIS — F51.01 PRIMARY INSOMNIA: ICD-10-CM

## 2022-06-27 DIAGNOSIS — Z00.00 WELLNESS EXAMINATION: ICD-10-CM

## 2022-06-27 DIAGNOSIS — I10 ESSENTIAL (PRIMARY) HYPERTENSION: ICD-10-CM

## 2022-06-27 DIAGNOSIS — R07.9 CHEST PAIN IN ADULT: ICD-10-CM

## 2022-06-27 DIAGNOSIS — I10 ESSENTIAL (PRIMARY) HYPERTENSION: Primary | ICD-10-CM

## 2022-06-27 LAB
ALBUMIN SERPL BCP-MCNC: 4 G/DL (ref 3.5–5.2)
ALP SERPL-CCNC: 149 U/L (ref 55–135)
ALT SERPL W/O P-5'-P-CCNC: 19 U/L (ref 10–44)
ANION GAP SERPL CALC-SCNC: 7 MMOL/L (ref 8–16)
AST SERPL-CCNC: 18 U/L (ref 10–40)
BASOPHILS # BLD AUTO: 0.07 K/UL (ref 0–0.2)
BASOPHILS NFR BLD: 0.8 % (ref 0–1.9)
BILIRUB SERPL-MCNC: 1 MG/DL (ref 0.1–1)
BUN SERPL-MCNC: 16 MG/DL (ref 6–20)
CALCIUM SERPL-MCNC: 9.9 MG/DL (ref 8.7–10.5)
CHLORIDE SERPL-SCNC: 107 MMOL/L (ref 95–110)
CHOLEST SERPL-MCNC: 160 MG/DL (ref 120–199)
CHOLEST/HDLC SERPL: 4.1 {RATIO} (ref 2–5)
CO2 SERPL-SCNC: 28 MMOL/L (ref 23–29)
COMPLEXED PSA SERPL-MCNC: 0.35 NG/ML (ref 0–4)
CREAT SERPL-MCNC: 1.2 MG/DL (ref 0.5–1.4)
DIFFERENTIAL METHOD: ABNORMAL
EOSINOPHIL # BLD AUTO: 0.1 K/UL (ref 0–0.5)
EOSINOPHIL NFR BLD: 1.6 % (ref 0–8)
ERYTHROCYTE [DISTWIDTH] IN BLOOD BY AUTOMATED COUNT: 13.3 % (ref 11.5–14.5)
EST. GFR  (AFRICAN AMERICAN): >60 ML/MIN/1.73 M^2
EST. GFR  (NON AFRICAN AMERICAN): >60 ML/MIN/1.73 M^2
GLUCOSE SERPL-MCNC: 93 MG/DL (ref 70–110)
HCT VFR BLD AUTO: 48.3 % (ref 40–54)
HDLC SERPL-MCNC: 39 MG/DL (ref 40–75)
HDLC SERPL: 24.4 % (ref 20–50)
HGB BLD-MCNC: 15.3 G/DL (ref 14–18)
IMM GRANULOCYTES # BLD AUTO: 0.1 K/UL (ref 0–0.04)
IMM GRANULOCYTES NFR BLD AUTO: 1.2 % (ref 0–0.5)
LDLC SERPL CALC-MCNC: 103 MG/DL (ref 63–159)
LYMPHOCYTES # BLD AUTO: 1.6 K/UL (ref 1–4.8)
LYMPHOCYTES NFR BLD: 18.8 % (ref 18–48)
MCH RBC QN AUTO: 27.6 PG (ref 27–31)
MCHC RBC AUTO-ENTMCNC: 31.7 G/DL (ref 32–36)
MCV RBC AUTO: 87 FL (ref 82–98)
MONOCYTES # BLD AUTO: 0.8 K/UL (ref 0.3–1)
MONOCYTES NFR BLD: 9.6 % (ref 4–15)
NEUTROPHILS # BLD AUTO: 5.9 K/UL (ref 1.8–7.7)
NEUTROPHILS NFR BLD: 68 % (ref 38–73)
NONHDLC SERPL-MCNC: 121 MG/DL
NRBC BLD-RTO: 0 /100 WBC
PLATELET # BLD AUTO: 211 K/UL (ref 150–450)
PMV BLD AUTO: 13.2 FL (ref 9.2–12.9)
POTASSIUM SERPL-SCNC: 4.4 MMOL/L (ref 3.5–5.1)
PROT SERPL-MCNC: 7.3 G/DL (ref 6–8.4)
RBC # BLD AUTO: 5.54 M/UL (ref 4.6–6.2)
SODIUM SERPL-SCNC: 142 MMOL/L (ref 136–145)
TRIGL SERPL-MCNC: 90 MG/DL (ref 30–150)
WBC # BLD AUTO: 8.69 K/UL (ref 3.9–12.7)

## 2022-06-27 PROCEDURE — 3079F DIAST BP 80-89 MM HG: CPT | Mod: CPTII,S$GLB,, | Performed by: FAMILY MEDICINE

## 2022-06-27 PROCEDURE — 86803 HEPATITIS C AB TEST: CPT | Performed by: FAMILY MEDICINE

## 2022-06-27 PROCEDURE — 1159F MED LIST DOCD IN RCRD: CPT | Mod: CPTII,S$GLB,, | Performed by: FAMILY MEDICINE

## 2022-06-27 PROCEDURE — 4010F ACE/ARB THERAPY RXD/TAKEN: CPT | Mod: CPTII,S$GLB,, | Performed by: FAMILY MEDICINE

## 2022-06-27 PROCEDURE — 3008F PR BODY MASS INDEX (BMI) DOCUMENTED: ICD-10-PCS | Mod: CPTII,S$GLB,, | Performed by: FAMILY MEDICINE

## 2022-06-27 PROCEDURE — 99214 PR OFFICE/OUTPT VISIT, EST, LEVL IV, 30-39 MIN: ICD-10-PCS | Mod: 25,S$GLB,, | Performed by: FAMILY MEDICINE

## 2022-06-27 PROCEDURE — 85025 COMPLETE CBC W/AUTO DIFF WBC: CPT | Performed by: FAMILY MEDICINE

## 2022-06-27 PROCEDURE — 3077F SYST BP >= 140 MM HG: CPT | Mod: CPTII,S$GLB,, | Performed by: FAMILY MEDICINE

## 2022-06-27 PROCEDURE — 87389 HIV-1 AG W/HIV-1&-2 AB AG IA: CPT | Performed by: FAMILY MEDICINE

## 2022-06-27 PROCEDURE — 4010F PR ACE/ARB THEARPY RXD/TAKEN: ICD-10-PCS | Mod: CPTII,S$GLB,, | Performed by: FAMILY MEDICINE

## 2022-06-27 PROCEDURE — 1159F PR MEDICATION LIST DOCUMENTED IN MEDICAL RECORD: ICD-10-PCS | Mod: CPTII,S$GLB,, | Performed by: FAMILY MEDICINE

## 2022-06-27 PROCEDURE — 99999 PR PBB SHADOW E&M-EST. PATIENT-LVL IV: CPT | Mod: PBBFAC,,, | Performed by: FAMILY MEDICINE

## 2022-06-27 PROCEDURE — 84153 ASSAY OF PSA TOTAL: CPT | Performed by: FAMILY MEDICINE

## 2022-06-27 PROCEDURE — 90471 TDAP VACCINE GREATER THAN OR EQUAL TO 7YO IM: ICD-10-PCS | Mod: S$GLB,,, | Performed by: FAMILY MEDICINE

## 2022-06-27 PROCEDURE — 90471 IMMUNIZATION ADMIN: CPT | Mod: S$GLB,,, | Performed by: FAMILY MEDICINE

## 2022-06-27 PROCEDURE — 36415 COLL VENOUS BLD VENIPUNCTURE: CPT | Mod: PO | Performed by: FAMILY MEDICINE

## 2022-06-27 PROCEDURE — 3079F PR MOST RECENT DIASTOLIC BLOOD PRESSURE 80-89 MM HG: ICD-10-PCS | Mod: CPTII,S$GLB,, | Performed by: FAMILY MEDICINE

## 2022-06-27 PROCEDURE — 90715 TDAP VACCINE 7 YRS/> IM: CPT | Mod: S$GLB,,, | Performed by: FAMILY MEDICINE

## 2022-06-27 PROCEDURE — 3008F BODY MASS INDEX DOCD: CPT | Mod: CPTII,S$GLB,, | Performed by: FAMILY MEDICINE

## 2022-06-27 PROCEDURE — 99999 PR PBB SHADOW E&M-EST. PATIENT-LVL IV: ICD-10-PCS | Mod: PBBFAC,,, | Performed by: FAMILY MEDICINE

## 2022-06-27 PROCEDURE — 99214 OFFICE O/P EST MOD 30 MIN: CPT | Mod: 25,S$GLB,, | Performed by: FAMILY MEDICINE

## 2022-06-27 PROCEDURE — 90715 TDAP VACCINE GREATER THAN OR EQUAL TO 7YO IM: ICD-10-PCS | Mod: S$GLB,,, | Performed by: FAMILY MEDICINE

## 2022-06-27 PROCEDURE — 80053 COMPREHEN METABOLIC PANEL: CPT | Performed by: FAMILY MEDICINE

## 2022-06-27 PROCEDURE — 1160F PR REVIEW ALL MEDS BY PRESCRIBER/CLIN PHARMACIST DOCUMENTED: ICD-10-PCS | Mod: CPTII,S$GLB,, | Performed by: FAMILY MEDICINE

## 2022-06-27 PROCEDURE — 1160F RVW MEDS BY RX/DR IN RCRD: CPT | Mod: CPTII,S$GLB,, | Performed by: FAMILY MEDICINE

## 2022-06-27 PROCEDURE — 80061 LIPID PANEL: CPT | Performed by: FAMILY MEDICINE

## 2022-06-27 PROCEDURE — 3077F PR MOST RECENT SYSTOLIC BLOOD PRESSURE >= 140 MM HG: ICD-10-PCS | Mod: CPTII,S$GLB,, | Performed by: FAMILY MEDICINE

## 2022-06-27 RX ORDER — AMLODIPINE BESYLATE 10 MG/1
10 TABLET ORAL DAILY
Qty: 90 TABLET | Refills: 3 | Status: SHIPPED | OUTPATIENT
Start: 2022-06-27 | End: 2023-07-28 | Stop reason: SDUPTHER

## 2022-06-27 RX ORDER — LISINOPRIL 40 MG/1
20 TABLET ORAL DAILY
Qty: 90 TABLET | Refills: 3 | Status: SHIPPED | OUTPATIENT
Start: 2022-06-27 | End: 2023-07-28 | Stop reason: SDUPTHER

## 2022-06-27 RX ORDER — AMLODIPINE BESYLATE 2.5 MG/1
2.5 TABLET ORAL DAILY
COMMUNITY
End: 2022-06-27

## 2022-06-27 RX ORDER — TRAZODONE HYDROCHLORIDE 50 MG/1
50 TABLET ORAL NIGHTLY
Qty: 90 TABLET | Refills: 3 | Status: SHIPPED | OUTPATIENT
Start: 2022-06-27 | End: 2023-07-28 | Stop reason: SDUPTHER

## 2022-06-27 RX ORDER — LISINOPRIL 40 MG/1
20 TABLET ORAL DAILY
COMMUNITY
End: 2022-06-27

## 2022-06-27 RX ORDER — TRAZODONE HYDROCHLORIDE 50 MG/1
50 TABLET ORAL NIGHTLY
COMMUNITY
End: 2022-06-27 | Stop reason: SDUPTHER

## 2022-06-28 LAB
HCV AB SERPL QL IA: NEGATIVE
HIV 1+2 AB+HIV1 P24 AG SERPL QL IA: NEGATIVE

## 2022-07-01 ENCOUNTER — PATIENT MESSAGE (OUTPATIENT)
Dept: ADMINISTRATIVE | Facility: HOSPITAL | Age: 47
End: 2022-07-01
Payer: COMMERCIAL

## 2022-07-08 ENCOUNTER — OFFICE VISIT (OUTPATIENT)
Dept: CARDIOLOGY | Facility: CLINIC | Age: 47
End: 2022-07-08
Payer: COMMERCIAL

## 2022-07-08 VITALS
WEIGHT: 219.56 LBS | HEIGHT: 70 IN | SYSTOLIC BLOOD PRESSURE: 170 MMHG | HEART RATE: 71 BPM | DIASTOLIC BLOOD PRESSURE: 121 MMHG | BODY MASS INDEX: 31.43 KG/M2

## 2022-07-08 DIAGNOSIS — I10 PRIMARY HYPERTENSION: ICD-10-CM

## 2022-07-08 DIAGNOSIS — I20.9 ANGINA PECTORIS: ICD-10-CM

## 2022-07-08 DIAGNOSIS — R07.9 CHEST PAIN IN ADULT: ICD-10-CM

## 2022-07-08 DIAGNOSIS — I10 ESSENTIAL (PRIMARY) HYPERTENSION: ICD-10-CM

## 2022-07-08 DIAGNOSIS — I11.9 HYPERTENSIVE LEFT VENTRICULAR HYPERTROPHY, WITHOUT HEART FAILURE: ICD-10-CM

## 2022-07-08 PROCEDURE — 93010 EKG 12-LEAD: ICD-10-PCS | Mod: S$GLB,,, | Performed by: INTERNAL MEDICINE

## 2022-07-08 PROCEDURE — 3080F DIAST BP >= 90 MM HG: CPT | Mod: CPTII,S$GLB,, | Performed by: INTERNAL MEDICINE

## 2022-07-08 PROCEDURE — 1160F RVW MEDS BY RX/DR IN RCRD: CPT | Mod: CPTII,S$GLB,, | Performed by: INTERNAL MEDICINE

## 2022-07-08 PROCEDURE — 99204 PR OFFICE/OUTPT VISIT, NEW, LEVL IV, 45-59 MIN: ICD-10-PCS | Mod: S$GLB,,, | Performed by: INTERNAL MEDICINE

## 2022-07-08 PROCEDURE — 3077F SYST BP >= 140 MM HG: CPT | Mod: CPTII,S$GLB,, | Performed by: INTERNAL MEDICINE

## 2022-07-08 PROCEDURE — 99999 PR PBB SHADOW E&M-EST. PATIENT-LVL III: ICD-10-PCS | Mod: PBBFAC,,, | Performed by: INTERNAL MEDICINE

## 2022-07-08 PROCEDURE — 93005 ELECTROCARDIOGRAM TRACING: CPT | Mod: PO

## 2022-07-08 PROCEDURE — 3077F PR MOST RECENT SYSTOLIC BLOOD PRESSURE >= 140 MM HG: ICD-10-PCS | Mod: CPTII,S$GLB,, | Performed by: INTERNAL MEDICINE

## 2022-07-08 PROCEDURE — 1159F PR MEDICATION LIST DOCUMENTED IN MEDICAL RECORD: ICD-10-PCS | Mod: CPTII,S$GLB,, | Performed by: INTERNAL MEDICINE

## 2022-07-08 PROCEDURE — 93010 ELECTROCARDIOGRAM REPORT: CPT | Mod: 76,S$GLB,, | Performed by: INTERNAL MEDICINE

## 2022-07-08 PROCEDURE — 3080F PR MOST RECENT DIASTOLIC BLOOD PRESSURE >= 90 MM HG: ICD-10-PCS | Mod: CPTII,S$GLB,, | Performed by: INTERNAL MEDICINE

## 2022-07-08 PROCEDURE — 1159F MED LIST DOCD IN RCRD: CPT | Mod: CPTII,S$GLB,, | Performed by: INTERNAL MEDICINE

## 2022-07-08 PROCEDURE — 4010F PR ACE/ARB THEARPY RXD/TAKEN: ICD-10-PCS | Mod: CPTII,S$GLB,, | Performed by: INTERNAL MEDICINE

## 2022-07-08 PROCEDURE — 1160F PR REVIEW ALL MEDS BY PRESCRIBER/CLIN PHARMACIST DOCUMENTED: ICD-10-PCS | Mod: CPTII,S$GLB,, | Performed by: INTERNAL MEDICINE

## 2022-07-08 PROCEDURE — 4010F ACE/ARB THERAPY RXD/TAKEN: CPT | Mod: CPTII,S$GLB,, | Performed by: INTERNAL MEDICINE

## 2022-07-08 PROCEDURE — 99204 OFFICE O/P NEW MOD 45 MIN: CPT | Mod: S$GLB,,, | Performed by: INTERNAL MEDICINE

## 2022-07-08 PROCEDURE — 99999 PR PBB SHADOW E&M-EST. PATIENT-LVL III: CPT | Mod: PBBFAC,,, | Performed by: INTERNAL MEDICINE

## 2022-07-08 PROCEDURE — 3008F PR BODY MASS INDEX (BMI) DOCUMENTED: ICD-10-PCS | Mod: CPTII,S$GLB,, | Performed by: INTERNAL MEDICINE

## 2022-07-08 PROCEDURE — 3008F BODY MASS INDEX DOCD: CPT | Mod: CPTII,S$GLB,, | Performed by: INTERNAL MEDICINE

## 2022-07-08 RX ORDER — CHLORTHALIDONE 25 MG/1
25 TABLET ORAL DAILY
Qty: 30 TABLET | Refills: 11 | Status: SHIPPED | OUTPATIENT
Start: 2022-07-08 | End: 2022-08-26

## 2022-07-08 NOTE — PROGRESS NOTES
Subjective:    Patient ID:  Slava Kamara is a 47 y.o. male who presents for evaluation of [facility]      HPI47 yo WM with difficult to control HTN who also has been having exertional CP radiating to shoulders and neck for several months. Resting EKG shows LVH.                                                                                                                                                                                                                                                                                                                           Review of Systems   Constitutional: Negative for decreased appetite, fever, malaise/fatigue, weight gain and weight loss.   HENT: Negative for hearing loss and nosebleeds.    Eyes: Negative for visual disturbance.   Cardiovascular: Positive for chest pain and dyspnea on exertion. Negative for claudication, cyanosis, irregular heartbeat, leg swelling, near-syncope, orthopnea, palpitations, paroxysmal nocturnal dyspnea and syncope.   Respiratory: Negative for cough, hemoptysis, shortness of breath, sleep disturbances due to breathing, snoring and wheezing.    Endocrine: Negative for cold intolerance, heat intolerance, polydipsia and polyuria.   Hematologic/Lymphatic: Negative for adenopathy and bleeding problem. Does not bruise/bleed easily.   Skin: Negative for color change, itching, poor wound healing, rash and suspicious lesions.   Musculoskeletal: Negative for arthritis, back pain, falls, joint pain, joint swelling, muscle cramps, muscle weakness and myalgias.   Gastrointestinal: Negative for bloating, abdominal pain, change in bowel habit, constipation, flatus, heartburn, hematemesis, hematochezia, hemorrhoids, jaundice, melena, nausea and vomiting.   Genitourinary: Negative for bladder incontinence, decreased libido, frequency, hematuria, hesitancy and urgency.   Neurological: Negative for brief paralysis, difficulty with  "concentration, excessive daytime sleepiness, dizziness, focal weakness, headaches, light-headedness, loss of balance, numbness, vertigo and weakness.   Psychiatric/Behavioral: Negative for altered mental status, depression and memory loss. The patient does not have insomnia and is not nervous/anxious.    Allergic/Immunologic: Negative for environmental allergies, hives and persistent infections.        Objective:    Physical Exam  Constitutional:       General: He is not in acute distress.     Appearance: He is well-developed. He is not diaphoretic.      Comments: BP (!) 170/121 (BP Location: Left arm, Patient Position: Sitting, BP Method: Large (Automatic))   Pulse 71   Ht 5' 10" (1.778 m)   Wt 99.6 kg (219 lb 9.3 oz)   BMI 31.51 kg/m²      HENT:      Head: Normocephalic and atraumatic.   Eyes:      General: Lids are normal. No scleral icterus.        Right eye: No discharge.      Conjunctiva/sclera: Conjunctivae normal.      Pupils: Pupils are equal, round, and reactive to light.   Neck:      Thyroid: No thyromegaly.      Vascular: No JVD.      Trachea: No tracheal deviation.   Cardiovascular:      Rate and Rhythm: Normal rate and regular rhythm.      Pulses: Intact distal pulses.           Carotid pulses are 2+ on the right side and 2+ on the left side.       Radial pulses are 2+ on the right side and 2+ on the left side.        Femoral pulses are 2+ on the right side and 2+ on the left side.       Popliteal pulses are 2+ on the right side and 2+ on the left side.        Dorsalis pedis pulses are 2+ on the right side and 2+ on the left side.        Posterior tibial pulses are 2+ on the right side and 2+ on the left side.      Heart sounds: Normal heart sounds, S1 normal and S2 normal. No murmur heard.    No friction rub. No gallop.   Pulmonary:      Effort: Pulmonary effort is normal. No respiratory distress.      Breath sounds: Normal breath sounds. No wheezing or rales.   Chest:      Chest wall: No " tenderness.   Abdominal:      General: Bowel sounds are normal. There is no distension.      Palpations: Abdomen is soft. There is no hepatomegaly or mass.      Tenderness: There is no abdominal tenderness. There is no guarding or rebound.   Musculoskeletal:         General: No tenderness. Normal range of motion.      Cervical back: Normal range of motion and neck supple.   Lymphadenopathy:      Cervical: No cervical adenopathy.   Skin:     General: Skin is warm and dry.      Coloration: Skin is not pale.      Findings: No erythema or rash.   Neurological:      Mental Status: He is alert and oriented to person, place, and time.      Cranial Nerves: No cranial nerve deficit.      Coordination: Coordination normal.      Deep Tendon Reflexes: Reflexes are normal and symmetric.   Psychiatric:         Speech: Speech normal.         Behavior: Behavior normal.         Thought Content: Thought content normal.         Judgment: Judgment normal.           Assessment:       1. Essential (primary) hypertension    2. Chest pain in adult    3. Angina pectoris    4. Primary hypertension    5. Hypertensive left ventricular hypertrophy, without heart failure         Plan:     Add chlorthalidone to current meds    Symptoms consistent with CAD      Orders Placed This Encounter   Procedures    Basic Metabolic Panel    Nuclear Stress - Cardiology Interpreted    CV US Renal Artery Stenosis Hypertension Complete    IN OFFICE EKG 12-LEAD (to Muse)    IN OFFICE EKG 12-LEAD (to Muse)    Echo     Follow up in about 7 weeks (around 8/26/2022).

## 2022-07-21 ENCOUNTER — PATIENT MESSAGE (OUTPATIENT)
Dept: CARDIOLOGY | Facility: HOSPITAL | Age: 47
End: 2022-07-21
Payer: COMMERCIAL

## 2022-07-22 ENCOUNTER — CLINICAL SUPPORT (OUTPATIENT)
Dept: CARDIOLOGY | Facility: HOSPITAL | Age: 47
End: 2022-07-22
Attending: INTERNAL MEDICINE
Payer: COMMERCIAL

## 2022-07-22 ENCOUNTER — HOSPITAL ENCOUNTER (OUTPATIENT)
Dept: RADIOLOGY | Facility: HOSPITAL | Age: 47
Discharge: HOME | End: 2022-07-22
Attending: INTERNAL MEDICINE
Payer: COMMERCIAL

## 2022-07-22 VITALS
BODY MASS INDEX: 31.35 KG/M2 | HEIGHT: 70 IN | SYSTOLIC BLOOD PRESSURE: 170 MMHG | WEIGHT: 219 LBS | DIASTOLIC BLOOD PRESSURE: 121 MMHG

## 2022-07-22 VITALS — BODY MASS INDEX: 31.35 KG/M2 | HEIGHT: 70 IN | WEIGHT: 219 LBS

## 2022-07-22 DIAGNOSIS — R07.9 CHEST PAIN IN ADULT: ICD-10-CM

## 2022-07-22 DIAGNOSIS — I11.9 HYPERTENSIVE LEFT VENTRICULAR HYPERTROPHY, WITHOUT HEART FAILURE: ICD-10-CM

## 2022-07-22 DIAGNOSIS — I20.9 ANGINA PECTORIS: ICD-10-CM

## 2022-07-22 PROCEDURE — 93306 ECHO (CUPID ONLY): ICD-10-PCS | Mod: 26,,, | Performed by: INTERNAL MEDICINE

## 2022-07-22 PROCEDURE — A9502 TC99M TETROFOSMIN: HCPCS | Mod: PO

## 2022-07-22 PROCEDURE — 93018 PR CARDIAC STRESS TST,INTERP/REPT ONLY: ICD-10-PCS | Mod: ,,, | Performed by: INTERNAL MEDICINE

## 2022-07-22 PROCEDURE — 93018 CV STRESS TEST I&R ONLY: CPT | Mod: ,,, | Performed by: INTERNAL MEDICINE

## 2022-07-22 PROCEDURE — 93306 TTE W/DOPPLER COMPLETE: CPT | Mod: PO

## 2022-07-22 PROCEDURE — 78452 HT MUSCLE IMAGE SPECT MULT: CPT | Mod: 26,,, | Performed by: INTERNAL MEDICINE

## 2022-07-22 PROCEDURE — 93016 CV STRESS TEST SUPVJ ONLY: CPT | Mod: ,,, | Performed by: INTERNAL MEDICINE

## 2022-07-22 PROCEDURE — 63600175 PHARM REV CODE 636 W HCPCS: Mod: PO | Performed by: INTERNAL MEDICINE

## 2022-07-22 PROCEDURE — 93017 CV STRESS TEST TRACING ONLY: CPT | Mod: PO

## 2022-07-22 PROCEDURE — 78452 STRESS TEST WITH MYOCARDIAL PERFUSION (CUPID ONLY): ICD-10-PCS | Mod: 26,,, | Performed by: INTERNAL MEDICINE

## 2022-07-22 PROCEDURE — 93016 STRESS TEST WITH MYOCARDIAL PERFUSION (CUPID ONLY): ICD-10-PCS | Mod: ,,, | Performed by: INTERNAL MEDICINE

## 2022-07-22 PROCEDURE — 93306 TTE W/DOPPLER COMPLETE: CPT | Mod: 26,,, | Performed by: INTERNAL MEDICINE

## 2022-07-22 RX ORDER — REGADENOSON 0.08 MG/ML
0.4 INJECTION, SOLUTION INTRAVENOUS ONCE
Status: COMPLETED | OUTPATIENT
Start: 2022-07-22 | End: 2022-07-22

## 2022-07-22 RX ADMIN — REGADENOSON 0.4 MG: 0.08 INJECTION, SOLUTION INTRAVENOUS at 01:07

## 2022-07-25 LAB
ASCENDING AORTA: 3.47 CM
AV INDEX (PROSTH): 0.77
AV MEAN GRADIENT: 2 MMHG
AV PEAK GRADIENT: 4 MMHG
AV VALVE AREA: 3.61 CM2
AV VELOCITY RATIO: 0.77
BSA FOR ECHO PROCEDURE: 2.21 M2
CV ECHO LV RWT: 0.52 CM
CV PHARM DOSE: 0.4 MG
CV STRESS BASE HR: 59 BPM
DIASTOLIC BLOOD PRESSURE: 89 MMHG
DOP CALC AO PEAK VEL: 0.97 M/S
DOP CALC AO VTI: 20.67 CM
DOP CALC LVOT AREA: 4.7 CM2
DOP CALC LVOT DIAMETER: 2.45 CM
DOP CALC LVOT PEAK VEL: 0.75 M/S
DOP CALC LVOT STROKE VOLUME: 74.68 CM3
DOP CALCLVOT PEAK VEL VTI: 15.85 CM
E WAVE DECELERATION TIME: 159.63 MSEC
E/A RATIO: 1.41
E/E' RATIO: 9.6 M/S
ECHO LV POSTERIOR WALL: 1.2 CM (ref 0.6–1.1)
EJECTION FRACTION: 65 %
FRACTIONAL SHORTENING: 32 % (ref 28–44)
INTERVENTRICULAR SEPTUM: 1.22 CM (ref 0.6–1.1)
IVRT: 106.57 MSEC
LA MAJOR: 4.8 CM
LA MINOR: 5.29 CM
LA WIDTH: 4.06 CM
LEFT ATRIUM SIZE: 4.12 CM
LEFT ATRIUM VOLUME INDEX: 33 ML/M2
LEFT ATRIUM VOLUME: 71.56 CM3
LEFT INTERNAL DIMENSION IN SYSTOLE: 3.14 CM (ref 2.1–4)
LEFT VENTRICLE DIASTOLIC VOLUME INDEX: 45.25 ML/M2
LEFT VENTRICLE DIASTOLIC VOLUME: 98.2 ML
LEFT VENTRICLE MASS INDEX: 96 G/M2
LEFT VENTRICLE SYSTOLIC VOLUME INDEX: 18.1 ML/M2
LEFT VENTRICLE SYSTOLIC VOLUME: 39.24 ML
LEFT VENTRICULAR INTERNAL DIMENSION IN DIASTOLE: 4.62 CM (ref 3.5–6)
LEFT VENTRICULAR MASS: 208.85 G
LV LATERAL E/E' RATIO: 9 M/S
LV SEPTAL E/E' RATIO: 10.29 M/S
MV A" WAVE DURATION": 6.47 MSEC
MV PEAK A VEL: 0.51 M/S
MV PEAK E VEL: 0.72 M/S
MV STENOSIS PRESSURE HALF TIME: 46.29 MS
MV VALVE AREA P 1/2 METHOD: 4.75 CM2
NUC STRESS EJECTION FRACTION: 59 %
OHS CV CPX 1 MINUTE RECOVERY HEART RATE: 87 BPM
OHS CV CPX 85 PERCENT MAX PREDICTED HEART RATE MALE: 145
OHS CV CPX MAX PREDICTED HEART RATE: 171
OHS CV CPX PATIENT IS FEMALE: 0
OHS CV CPX PATIENT IS MALE: 1
OHS CV CPX PEAK DIASTOLIC BLOOD PRESSURE: 89 MMHG
OHS CV CPX PEAK HEAR RATE: 97 BPM
OHS CV CPX PEAK RATE PRESSURE PRODUCT: NORMAL
OHS CV CPX PEAK SYSTOLIC BLOOD PRESSURE: 129 MMHG
OHS CV CPX PERCENT MAX PREDICTED HEART RATE ACHIEVED: 57
OHS CV CPX RATE PRESSURE PRODUCT PRESENTING: 7611
OHS CV PHARM TIME: 1346 MIN
PISA MRMAX VEL: 0.05 M/S
PISA TR MAX VEL: 2.11 M/S
PULM VEIN S/D RATIO: 0.89
PV PEAK D VEL: 0.47 M/S
PV PEAK S VEL: 0.42 M/S
RA MAJOR: 5.03 CM
RA PRESSURE: 3 MMHG
RA WIDTH: 3.23 CM
RIGHT VENTRICULAR END-DIASTOLIC DIMENSION: 4.36 CM
RV TISSUE DOPPLER FREE WALL SYSTOLIC VELOCITY 1 (APICAL 4 CHAMBER VIEW): 14.6 CM/S
SINUS: 3.5 CM
STJ: 3.31 CM
SYSTOLIC BLOOD PRESSURE: 129 MMHG
TDI LATERAL: 0.08 M/S
TDI SEPTAL: 0.07 M/S
TDI: 0.08 M/S
TR MAX PG: 18 MMHG
TRICUSPID ANNULAR PLANE SYSTOLIC EXCURSION: 2.26 CM
TV REST PULMONARY ARTERY PRESSURE: 21 MMHG

## 2022-07-26 ENCOUNTER — TELEPHONE (OUTPATIENT)
Dept: CARDIOLOGY | Facility: CLINIC | Age: 47
End: 2022-07-26
Payer: COMMERCIAL

## 2023-01-03 ENCOUNTER — HOSPITAL ENCOUNTER (EMERGENCY)
Facility: HOSPITAL | Age: 48
Discharge: HOME | End: 2023-01-03
Attending: EMERGENCY MEDICINE
Payer: COMMERCIAL

## 2023-01-03 VITALS
RESPIRATION RATE: 17 BRPM | DIASTOLIC BLOOD PRESSURE: 122 MMHG | TEMPERATURE: 98 F | OXYGEN SATURATION: 100 % | HEART RATE: 93 BPM | BODY MASS INDEX: 31.57 KG/M2 | WEIGHT: 220 LBS | SYSTOLIC BLOOD PRESSURE: 176 MMHG

## 2023-01-03 DIAGNOSIS — M54.41 ACUTE RIGHT-SIDED LOW BACK PAIN WITH RIGHT-SIDED SCIATICA: Primary | ICD-10-CM

## 2023-01-03 LAB
BILIRUB UR QL STRIP: NEGATIVE
CLARITY UR: CLEAR
COLOR UR: YELLOW
GLUCOSE UR QL STRIP: NEGATIVE
HGB UR QL STRIP: NEGATIVE
KETONES UR QL STRIP: NEGATIVE
LEUKOCYTE ESTERASE UR QL STRIP: NEGATIVE
NITRITE UR QL STRIP: NEGATIVE
PH UR STRIP: 6 [PH] (ref 5–8)
PROT UR QL STRIP: NEGATIVE
SP GR UR STRIP: 1.02 (ref 1–1.03)
URN SPEC COLLECT METH UR: NORMAL
UROBILINOGEN UR STRIP-ACNC: NEGATIVE EU/DL

## 2023-01-03 PROCEDURE — 99283 EMERGENCY DEPT VISIT LOW MDM: CPT

## 2023-01-03 PROCEDURE — 81003 URINALYSIS AUTO W/O SCOPE: CPT | Performed by: EMERGENCY MEDICINE

## 2023-01-03 RX ORDER — METHOCARBAMOL 500 MG/1
1000 TABLET, FILM COATED ORAL 4 TIMES DAILY
Qty: 40 TABLET | Refills: 0 | Status: SHIPPED | OUTPATIENT
Start: 2023-01-03 | End: 2023-01-08

## 2023-01-03 RX ORDER — HYDROCODONE BITARTRATE AND ACETAMINOPHEN 10; 325 MG/1; MG/1
1 TABLET ORAL EVERY 6 HOURS PRN
Qty: 12 TABLET | Refills: 0 | Status: SHIPPED | OUTPATIENT
Start: 2023-01-03 | End: 2023-01-06

## 2023-01-04 NOTE — DISCHARGE INSTRUCTIONS
Please follow-up with your family physician in the next few days.  Return for worsening symptoms especially difficulty urinating defecating or worsening pain decreased strength

## 2023-01-04 NOTE — ED PROVIDER NOTES
Encounter Date: 1/3/2023       History     Chief Complaint   Patient presents with    Back Pain     Lower back pain radiating down right hip and right leg x 2 weeks.      Chief complaint is lower back pain for about 2 weeks.  Worse on ambulation.  The patient has a history of sciatica 7 years ago.  The patient denies any trauma.  He has no trouble urinating or defecating.  He is 47 years old.  He denies any fever.  No history of steroid use or IV drug use.  No history of unexplained weight loss.  No history of cancer.  He is awake alert conversive in no apparent distress.  He denies fever chills chest pain nausea vomiting diarrhea trouble urinating or any other problems      Review of patient's allergies indicates:   Allergen Reactions    Sulfa (sulfonamide antibiotics) Anaphylaxis    Bee sting [allergen ext-venom-honey bee]      Past Medical History:   Diagnosis Date    Anxiety     Depression     Hypertension     Kidney stones     stones    Snoring     patient reports suspected HARJIT, needs sleep study     Past Surgical History:   Procedure Laterality Date    APPENDECTOMY      CYSTOSCOPY W/ LASER LITHOTRIPSY      EXTRACORPOREAL SHOCK WAVE LITHOTRIPSY  1996, 10/4/2016    KNEE SURGERY       Family History   Problem Relation Age of Onset    Heart disease Mother     Stroke Mother     Heart attack Mother     Heart disease Father     Stroke Father     Heart attack Father      Social History     Tobacco Use    Smoking status: Never    Smokeless tobacco: Never   Substance Use Topics    Alcohol use: No     Comment: occasional    Drug use: No     Review of Systems   Constitutional:  Negative for chills and fever.   HENT:  Negative for ear pain, rhinorrhea and sore throat.    Eyes:  Negative for pain and visual disturbance.   Respiratory:  Negative for cough and shortness of breath.    Cardiovascular:  Negative for chest pain and palpitations.   Gastrointestinal:  Negative for abdominal pain, constipation, diarrhea, nausea and  vomiting.   Genitourinary:  Negative for dysuria, frequency, hematuria and urgency.   Musculoskeletal:  Positive for back pain. Negative for joint swelling and myalgias.   Skin:  Negative for rash.   Neurological:  Negative for dizziness, seizures, weakness and headaches.   Psychiatric/Behavioral:  Negative for dysphoric mood. The patient is not nervous/anxious.      Physical Exam     Initial Vitals [01/03/23 1902]   BP Pulse Resp Temp SpO2   (!) 176/122 93 17 98.1 °F (36.7 °C) 100 %      MAP       --         Physical Exam    Nursing note and vitals reviewed.  Constitutional: He appears well-developed and well-nourished.   HENT:   Head: Normocephalic and atraumatic.   Eyes: Conjunctivae, EOM and lids are normal. Pupils are equal, round, and reactive to light.   Neck: Trachea normal. Neck supple. No thyroid mass present.   Cardiovascular:  Normal rate, regular rhythm and normal heart sounds.           Pulmonary/Chest: Breath sounds normal. No respiratory distress.   Abdominal: Abdomen is soft. There is no abdominal tenderness.   Musculoskeletal:         General: Normal range of motion.      Cervical back: Neck supple.     Neurological: He is alert and oriented to person, place, and time. He has normal strength and normal reflexes. No cranial nerve deficit or sensory deficit.   Sensation intact reflex patient able to ambulate without major difficulty.  He can heel walk toe walk.  He has slight pain on squatting.   Skin: Skin is warm and dry.   No skin color changes to the right lower back.  No definite sciatica on exam.  On sciatica testing patient has pain to the right back slightly down the right leg.   Psychiatric: He has a normal mood and affect. His speech is normal and behavior is normal. Judgment and thought content normal.       ED Course   Procedures  Labs Reviewed   URINALYSIS, REFLEX TO URINE CULTURE    Narrative:     Specimen Source->Urine          Imaging Results              X-Ray Lumbar Spine 5 View (In  process)  Result time 01/03/23 21:05:58                     Medications - No data to display  Medical Decision Making:   ED Management:  The patient has mild symptoms of back pain and mild sciatica.  He will be discharged with pain medicine muscle relaxer return if any worsening symptoms.  I do not feel x-rays are needed the present time.                        Clinical Impression:   Final diagnoses:  [M54.41] Acute right-sided low back pain with right-sided sciatica (Primary)        ED Disposition Condition    Discharge Stable          ED Prescriptions       Medication Sig Dispense Start Date End Date Auth. Provider    HYDROcodone-acetaminophen (NORCO)  mg per tablet Take 1 tablet by mouth every 6 (six) hours as needed for Pain. 12 tablet 1/3/2023 1/6/2023 Favian Florence MD    methocarbamoL (ROBAXIN) 500 MG Tab Take 2 tablets (1,000 mg total) by mouth 4 (four) times daily. for 5 days 40 tablet 1/3/2023 1/8/2023 Favian Florence MD          Follow-up Information    None          Favian Florence MD  01/03/23 0923

## 2023-01-04 NOTE — FIRST PROVIDER EVALUATION
Medical screening examination initiated.  I have conducted a focused provider triage encounter, findings are as follows:    Brief history of present illness: back pain     Vitals:    01/03/23 1902   BP: (!) 176/122   BP Location: Left arm   Patient Position: Sitting   Pulse: 93   Resp: 17   Temp: 98.1 °F (36.7 °C)   TempSrc: Oral   SpO2: 100%   Weight: 99.8 kg (220 lb)       Pertinent physical exam:  Patient here with complaint of back pain to the right lower back that radiates down his right leg reports he has a history of sciatica as well as kidney stones denies any flank pain    Brief workup plan: ua xrays     Preliminary workup initiated; this workup will be continued and followed by the physician or advanced practice provider that is assigned to the patient when roomed.

## 2023-02-04 ENCOUNTER — HOSPITAL ENCOUNTER (EMERGENCY)
Facility: HOSPITAL | Age: 48
Discharge: HOME | End: 2023-02-04
Attending: EMERGENCY MEDICINE
Payer: COMMERCIAL

## 2023-02-04 VITALS
RESPIRATION RATE: 16 BRPM | WEIGHT: 222.13 LBS | TEMPERATURE: 99 F | OXYGEN SATURATION: 100 % | HEART RATE: 82 BPM | SYSTOLIC BLOOD PRESSURE: 168 MMHG | DIASTOLIC BLOOD PRESSURE: 112 MMHG | BODY MASS INDEX: 31.87 KG/M2
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
## 2023-02-04 DIAGNOSIS — Z20.3 NEED FOR POST EXPOSURE PROPHYLAXIS FOR RABIES: ICD-10-CM

## 2023-02-04 DIAGNOSIS — L02.91 ABSCESS: ICD-10-CM

## 2023-02-04 LAB
ALBUMIN SERPL BCP-MCNC: 3.9 G/DL (ref 3.5–5.2)
ALP SERPL-CCNC: 143 U/L (ref 55–135)
ALT SERPL W/O P-5'-P-CCNC: 19 U/L (ref 10–44)
ANION GAP SERPL CALC-SCNC: 10 MMOL/L (ref 8–16)
AST SERPL-CCNC: 17 U/L (ref 10–40)
BASOPHILS # BLD AUTO: 0.08 K/UL (ref 0–0.2)
BASOPHILS NFR BLD: 0.6 % (ref 0–1.9)
BILIRUB SERPL-MCNC: 1.3 MG/DL (ref 0.1–1)
BUN SERPL-MCNC: 18 MG/DL (ref 6–20)
CALCIUM SERPL-MCNC: 9.3 MG/DL (ref 8.7–10.5)
CHLORIDE SERPL-SCNC: 109 MMOL/L (ref 95–110)
CO2 SERPL-SCNC: 21 MMOL/L (ref 23–29)
CREAT SERPL-MCNC: 1.1 MG/DL (ref 0.5–1.4)
DIFFERENTIAL METHOD: ABNORMAL
EOSINOPHIL # BLD AUTO: 0.1 K/UL (ref 0–0.5)
EOSINOPHIL NFR BLD: 1.1 % (ref 0–8)
ERYTHROCYTE [DISTWIDTH] IN BLOOD BY AUTOMATED COUNT: 13.6 % (ref 11.5–14.5)
EST. GFR  (NO RACE VARIABLE): >60 ML/MIN/1.73 M^2
GLUCOSE SERPL-MCNC: 106 MG/DL (ref 70–110)
HCT VFR BLD AUTO: 41.8 % (ref 40–54)
HCV AB SERPL QL IA: NORMAL
HGB BLD-MCNC: 13.9 G/DL (ref 14–18)
HIV 1+2 AB+HIV1 P24 AG SERPL QL IA: NORMAL
IMM GRANULOCYTES # BLD AUTO: 0.09 K/UL (ref 0–0.04)
IMM GRANULOCYTES NFR BLD AUTO: 0.7 % (ref 0–0.5)
LYMPHOCYTES # BLD AUTO: 2.1 K/UL (ref 1–4.8)
LYMPHOCYTES NFR BLD: 16.3 % (ref 18–48)
MCH RBC QN AUTO: 27.9 PG (ref 27–31)
MCHC RBC AUTO-ENTMCNC: 33.3 G/DL (ref 32–36)
MCV RBC AUTO: 84 FL (ref 82–98)
MONOCYTES # BLD AUTO: 1.3 K/UL (ref 0.3–1)
MONOCYTES NFR BLD: 10.2 % (ref 4–15)
NEUTROPHILS # BLD AUTO: 9 K/UL (ref 1.8–7.7)
NEUTROPHILS NFR BLD: 71.1 % (ref 38–73)
NRBC BLD-RTO: 0 /100 WBC
PLATELET # BLD AUTO: 246 K/UL (ref 150–450)
PMV BLD AUTO: 11.5 FL (ref 9.2–12.9)
POTASSIUM SERPL-SCNC: 3.8 MMOL/L (ref 3.5–5.1)
PROT SERPL-MCNC: 7 G/DL (ref 6–8.4)
RBC # BLD AUTO: 4.99 M/UL (ref 4.6–6.2)
SODIUM SERPL-SCNC: 140 MMOL/L (ref 136–145)
WBC # BLD AUTO: 12.59 K/UL (ref 3.9–12.7)

## 2023-02-04 PROCEDURE — 36415 COLL VENOUS BLD VENIPUNCTURE: CPT | Performed by: EMERGENCY MEDICINE

## 2023-02-04 PROCEDURE — 96365 THER/PROPH/DIAG IV INF INIT: CPT | Mod: 59

## 2023-02-04 PROCEDURE — 86803 HEPATITIS C AB TEST: CPT | Performed by: EMERGENCY MEDICINE

## 2023-02-04 PROCEDURE — 25000003 PHARM REV CODE 250: Performed by: EMERGENCY MEDICINE

## 2023-02-04 PROCEDURE — 90471 IMMUNIZATION ADMIN: CPT | Performed by: EMERGENCY MEDICINE

## 2023-02-04 PROCEDURE — 90375 RABIES IG IM/SC: CPT | Performed by: EMERGENCY MEDICINE

## 2023-02-04 PROCEDURE — 10060 I&D ABSCESS SIMPLE/SINGLE: CPT

## 2023-02-04 PROCEDURE — 63600175 PHARM REV CODE 636 W HCPCS: Performed by: EMERGENCY MEDICINE

## 2023-02-04 PROCEDURE — 80053 COMPREHEN METABOLIC PANEL: CPT | Performed by: EMERGENCY MEDICINE

## 2023-02-04 PROCEDURE — 90675 RABIES VACCINE IM: CPT | Performed by: EMERGENCY MEDICINE

## 2023-02-04 PROCEDURE — 99284 EMERGENCY DEPT VISIT MOD MDM: CPT | Mod: 25

## 2023-02-04 PROCEDURE — 87389 HIV-1 AG W/HIV-1&-2 AB AG IA: CPT | Performed by: EMERGENCY MEDICINE

## 2023-02-04 PROCEDURE — 85025 COMPLETE CBC W/AUTO DIFF WBC: CPT | Performed by: EMERGENCY MEDICINE

## 2023-02-04 RX ORDER — LIDOCAINE HYDROCHLORIDE 10 MG/ML
5 INJECTION INFILTRATION; PERINEURAL
Status: COMPLETED | OUTPATIENT
Start: 2023-02-04 | End: 2023-02-04

## 2023-02-04 RX ORDER — AMOXICILLIN AND CLAVULANATE POTASSIUM 875; 125 MG/1; MG/1
1 TABLET, FILM COATED ORAL 2 TIMES DAILY
Qty: 14 TABLET | Refills: 0 | Status: SHIPPED | OUTPATIENT
Start: 2023-02-04 | End: 2023-02-11 | Stop reason: SDUPTHER

## 2023-02-04 RX ADMIN — LIDOCAINE HYDROCHLORIDE 5 ML: 10 INJECTION, SOLUTION INFILTRATION; PERINEURAL at 03:02

## 2023-02-04 RX ADMIN — NEOMYCIN AND POLYMYXIN B SULFATES AND BACITRACIN ZINC 1 EACH: 400; 3.5; 5 OINTMENT TOPICAL at 05:02

## 2023-02-04 RX ADMIN — RABIES VACCINE 2.5 UNITS: KIT at 04:02

## 2023-02-04 RX ADMIN — RABIES IMMUNE GLOBULIN (HUMAN) 2010 UNITS: 300 INJECTION, SOLUTION INFILTRATION; INTRAMUSCULAR at 04:02

## 2023-02-04 RX ADMIN — PIPERACILLIN AND TAZOBACTAM 4.5 G: 4; .5 INJECTION, POWDER, LYOPHILIZED, FOR SOLUTION INTRAVENOUS; PARENTERAL at 03:02

## 2023-02-04 NOTE — DISCHARGE INSTRUCTIONS
You will require 3 more vaccination shots over the course of the next 2 weeks.  You will need to come to the emergency department and bring this paper with you.  You will receive your 3 remaining injections on the following dates:  February 7, 2023; February 11, 2023 and February 18, 2023.    Tylenol and Motrin for pain

## 2023-02-04 NOTE — ED PROVIDER NOTES
Encounter Date: 2/4/2023       History     Chief Complaint   Patient presents with    Animal Bite     To finger on left hand. Unknown dog     This patient presents the emergency department 14 hours after sustaining a dog bite to his left index finger overlying the dorsal aspect of the PIP.  Patient is now concerned because there is pus draining from the area.  Patient does not know the dog was a stray animal that he tried to prevent from attacking his dog.  Patient lifted his dog away from the dog and subsequently sustained a bite to the left index finger.  Patient denies any fever has no other complaints of.    The history is provided by the patient.   Review of patient's allergies indicates:   Allergen Reactions    Sulfa (sulfonamide antibiotics) Anaphylaxis    Bee sting [allergen ext-venom-honey bee]      Past Medical History:   Diagnosis Date    Anxiety     Depression     Hypertension     Kidney stones     stones    Snoring     patient reports suspected HARJIT, needs sleep study     Past Surgical History:   Procedure Laterality Date    APPENDECTOMY      CYSTOSCOPY W/ LASER LITHOTRIPSY      EXTRACORPOREAL SHOCK WAVE LITHOTRIPSY  1996, 10/4/2016    KNEE SURGERY       Family History   Problem Relation Age of Onset    Heart disease Mother     Stroke Mother     Heart attack Mother     Heart disease Father     Stroke Father     Heart attack Father      Social History     Tobacco Use    Smoking status: Never    Smokeless tobacco: Never   Substance Use Topics    Alcohol use: No    Drug use: No     Review of Systems   Skin:  Positive for wound.     Physical Exam     Initial Vitals [02/04/23 0309]   BP Pulse Resp Temp SpO2   (!) 168/112 82 16 99 °F (37.2 °C) 100 %      MAP       --         Physical Exam    Nursing note and vitals reviewed.  Constitutional: Vital signs are normal. He appears well-developed. He is active and cooperative.   HENT:   Head: Normocephalic and atraumatic.   Eyes: Conjunctivae, EOM and lids are  normal. Pupils are equal, round, and reactive to light.   Neck: Trachea normal. Neck supple. No thyroid mass present.    Full passive range of motion without pain.     Cardiovascular:  Normal rate, regular rhythm, S1 normal, S2 normal, normal heart sounds, intact distal pulses and normal pulses.           Pulmonary/Chest: Effort normal and breath sounds normal.   Abdominal: Abdomen is soft and flat. Bowel sounds are normal. There is no abdominal tenderness.   Musculoskeletal:         General: Normal range of motion.        Arms:       Cervical back: Full passive range of motion without pain and neck supple.     Lymphadenopathy:     He has no axillary adenopathy.   Neurological: He is alert and oriented to person, place, and time.   Skin: Skin is warm, dry and intact.   Psychiatric: He has a normal mood and affect. His speech is normal and behavior is normal. Judgment and thought content normal. Cognition and memory are normal.       ED Course   Critical Care    Date/Time: 2/4/2023 4:59 AM  Performed by: Ezequiel Maurer MD  Authorized by: Ezequiel Maurer MD   Direct patient critical care time: 11 minutes  Additional history critical care time: 10 minutes  Ordering / reviewing critical care time: 9 minutes  Documentation critical care time: 11 minutes  Total critical care time (exclusive of procedural time) : 41 minutes  Critical care time was exclusive of separately billable procedures and treating other patients and teaching time.  Critical care was time spent personally by me on the following activities: evaluation of patient's response to treatment, examination of patient, ordering and performing treatments and interventions, ordering and review of laboratory studies, ordering and review of radiographic studies, re-evaluation of patient's condition and review of old charts.      I & D - Incision and Drainage    Date/Time: 2/4/2023 5:00 AM  Location procedure was performed: [facility] EMERGENCY  DEPARTMENT  Performed by: Ezequiel Maurer MD  Authorized by: Ezequiel Maurer MD   Pre-operative diagnosis: Abscess  Post-operative diagnosis: Abscess  Consent Done: Emergent Situation  Type: abscess  Body area: upper extremity  Location details: left index finger  Anesthesia: digital block    Anesthesia:  Local Anesthetic: lidocaine 1% without epinephrine  Anesthetic total: 3 mL    Patient sedated: no  Description of findings: Superficial pustule left index PIP dorsum   Scalpel size: 11 (And scissors)  Incision type: single straight  Complexity: simple  Drainage: pus  Drainage amount: scant  Wound treatment: expression of material and incision (Skin on surface removed)  Complications: No  Specimens: No  Implants: No  Patient tolerance: Patient tolerated the procedure well with no immediate complications      Labs Reviewed   CBC W/ AUTO DIFFERENTIAL - Abnormal; Notable for the following components:       Result Value    Hemoglobin 13.9 (*)     Immature Granulocytes 0.7 (*)     Gran # (ANC) 9.0 (*)     Immature Grans (Abs) 0.09 (*)     Mono # 1.3 (*)     Lymph % 16.3 (*)     All other components within normal limits   COMPREHENSIVE METABOLIC PANEL - Abnormal; Notable for the following components:    CO2 21 (*)     Total Bilirubin 1.3 (*)     Alkaline Phosphatase 143 (*)     All other components within normal limits   HIV 1 / 2 ANTIBODY   HEPATITIS C ANTIBODY          Imaging Results              X-Ray Finger 2 or More Views Left (In process)                      Medications   LIDOcaine HCL 10 mg/ml (1%) injection 5 mL (5 mLs Subcutaneous Given 2/4/23 0330)   piperacillin-tazobactam (ZOSYN) 4.5 g in dextrose 5 % in water (D5W) 5 % 100 mL IVPB (MB+) (0 g Intravenous Stopped 2/4/23 0414)   rabies vaccine, PCEC injection 2.5 Units (2.5 Units Intramuscular Given 2/4/23 0404)   rabies immune globulin (PF) (HYPERRAB) injection 2,010 Units (2,010 Units Other Given 2/4/23 0431)   neomycin-bacitracnZn-polymyxnB packet  1 each (1 each Topical (Top) Given 2/4/23 0515)     Medical Decision Making:   ED Management:  The patient was irrigated extensively with a Hibiclens brush.  I did remove some necrotic skin from the surface of the wound.  The wound is not appear to have any significant depth to it nor puncture appreciated the bite appears to be more along a tangential line on the dorsum of the PIP.  There does not appear to be any joint involvement.  Believe the swelling is associated with localized infection.  I considered admitting this patient to the hospital however I feel the patient can be adequately managed as an outpatient.  I did provide the patient with human rabies immunoglobulin and rabies vaccine.  Patient will be provided with appropriate follow-up instructions and follow-up care.  The patient received rabies vaccine as well as rabies immunoglobulin.  The patient will need vaccine on the 3rd 7th and 14th day.  This will be arranged and followed up in this emergency department.  I will also discharge the patient on oral antibiotic therapy.  Patient will be instructed to return for any worsening symptomatology.    The patient received rabies immunoglobulin.  I infiltrated around the wound with approximately 2.5 cc of immunoglobulin.  The remainder of the immunoglobulin was injected in the left gluteal area.  The rabies vaccine was given in the opposite side of the body.                        Clinical Impression:   Final diagnoses:  [W54.0XXA] Dog bite, initial encounter (Primary)  [L02.91] Abscess  [Z20.3] Need for post exposure prophylaxis for rabies        ED Disposition Condition    Discharge Stable          ED Prescriptions       Medication Sig Dispense Start Date End Date Auth. Provider    amoxicillin-clavulanate 875-125mg (AUGMENTIN) 875-125 mg per tablet Take 1 tablet by mouth 2 (two) times daily. 14 tablet 2/4/2023 -- Ezequiel Maurer MD          Follow-up Information       Follow up With Specialties Details  Why Contact Info    David Hannah MD Family Medicine Schedule an appointment as soon as possible for a visit in 1 week  [address] [place] [address]  [phone]               Ezequiel Maurer MD  02/04/23 0535

## 2023-02-07 ENCOUNTER — HOSPITAL ENCOUNTER (EMERGENCY)
Facility: HOSPITAL | Age: 48
Discharge: HOME | End: 2023-02-07
Payer: COMMERCIAL

## 2023-02-07 VITALS
SYSTOLIC BLOOD PRESSURE: 153 MMHG | WEIGHT: 220 LBS | HEIGHT: 70 IN | HEART RATE: 71 BPM | DIASTOLIC BLOOD PRESSURE: 78 MMHG | BODY MASS INDEX: 31.5 KG/M2 | TEMPERATURE: 98 F | OXYGEN SATURATION: 96 % | RESPIRATION RATE: 18 BRPM

## 2023-02-07 DIAGNOSIS — Z23 ENCOUNTER FOR REPEAT ADMINISTRATION OF RABIES VACCINATION: ICD-10-CM

## 2023-02-07 DIAGNOSIS — Z23 NEED FOR RABIES VACCINATION: Primary | ICD-10-CM

## 2023-02-07 DIAGNOSIS — Z51.89 VISIT FOR WOUND CHECK: ICD-10-CM

## 2023-02-07 PROCEDURE — 90471 IMMUNIZATION ADMIN: CPT | Performed by: STUDENT IN AN ORGANIZED HEALTH CARE EDUCATION/TRAINING PROGRAM

## 2023-02-07 PROCEDURE — 90675 RABIES VACCINE IM: CPT | Performed by: STUDENT IN AN ORGANIZED HEALTH CARE EDUCATION/TRAINING PROGRAM

## 2023-02-07 PROCEDURE — 99284 EMERGENCY DEPT VISIT MOD MDM: CPT | Mod: 25

## 2023-02-07 PROCEDURE — 63600175 PHARM REV CODE 636 W HCPCS: Performed by: STUDENT IN AN ORGANIZED HEALTH CARE EDUCATION/TRAINING PROGRAM

## 2023-02-07 RX ADMIN — RABIES VACCINE 2.5 UNITS: KIT at 06:02

## 2023-02-07 NOTE — DISCHARGE INSTRUCTIONS
Please return to the emergency department or go to the [facility] for repeat vaccination in 4 days.  Please return to the ER with any new or worsening symptoms involving wound.

## 2023-02-07 NOTE — ED PROVIDER NOTES
Encounter Date: 2/7/2023       History     Chief Complaint   Patient presents with    Injections     For 2nd rabies vaccine     HPI    Patient is a 48-year-old male presenting to the emergency department for evaluation of wound to left hand and for his 2nd rabies vaccination shot.  He is on day 3 at this time.  He received the immunoglobulin and the vaccine on 02/04/2023.  He was bit by a stray dog on his left hand.  She notes the swelling has reduced significantly and the pain is improved.  He was evaluated on the for of this month and they are not appear to be any joint involvement at that time.  Patient has been on Augmentin.  He denies fever, chills, nausea, vomiting, abdominal pain, chest pain, shortness of breath, dysuria, hematuria, headache, diaphoresis.  No other mitigating or exacerbating factors.  Review of patient's allergies indicates:   Allergen Reactions    Sulfa (sulfonamide antibiotics) Anaphylaxis    Bee sting [allergen ext-venom-honey bee]      Past Medical History:   Diagnosis Date    Anxiety     Depression     Hypertension     Kidney stones     stones    Snoring     patient reports suspected HARJIT, needs sleep study     Past Surgical History:   Procedure Laterality Date    APPENDECTOMY      CYSTOSCOPY W/ LASER LITHOTRIPSY      EXTRACORPOREAL SHOCK WAVE LITHOTRIPSY  1996, 10/4/2016    KNEE SURGERY       Family History   Problem Relation Age of Onset    Heart disease Mother     Stroke Mother     Heart attack Mother     Heart disease Father     Stroke Father     Heart attack Father      Social History     Tobacco Use    Smoking status: Never    Smokeless tobacco: Never   Substance Use Topics    Alcohol use: No    Drug use: No     Review of Systems   Constitutional:  Negative for fever.   HENT:  Negative for sore throat.    Respiratory:  Negative for shortness of breath.    Cardiovascular:  Negative for chest pain.   Gastrointestinal:  Negative for nausea.   Genitourinary:  Negative for dysuria.    Musculoskeletal:  Negative for back pain.   Skin:  Positive for wound. Negative for rash.   Neurological:  Negative for weakness.   Hematological:  Does not bruise/bleed easily.     Physical Exam     Initial Vitals [02/07/23 0552]   BP Pulse Resp Temp SpO2   (!) 159/111 75 18 97.9 °F (36.6 °C) 100 %      MAP       --         Physical Exam    Nursing note and vitals reviewed.  Constitutional: He appears well-developed and well-nourished. He is not diaphoretic. No distress.   HENT:   Head: Normocephalic and atraumatic.   Right Ear: External ear normal.   Left Ear: External ear normal.   Nose: Nose normal.   Eyes: Conjunctivae are normal. No scleral icterus.   Neck: Neck supple. No tracheal deviation present.   Cardiovascular:  Normal rate, regular rhythm, normal heart sounds and intact distal pulses.     Exam reveals no gallop and no friction rub.       No murmur heard.  Pulmonary/Chest: Breath sounds normal. No respiratory distress.   Abdominal: Abdomen is soft. Bowel sounds are normal. There is no abdominal tenderness.   Musculoskeletal:      Cervical back: Neck supple.      Comments: Wounds noted to dorsal aspect of left hand dorsum, dorsal aspect of 2nd digit overlying proximal phalanges.  Mild swelling noted to these areas.  No fusiform swelling to the area.  Pain with ranging at the PIP joint.  2+ radial pulse.  No streaking noted.     Neurological: He is alert and oriented to person, place, and time. GCS score is 15. GCS eye subscore is 4. GCS verbal subscore is 5. GCS motor subscore is 6.   Skin: Skin is warm and dry.   Psychiatric: He has a normal mood and affect. His behavior is normal. Thought content normal.       ED Course   Procedures  Labs Reviewed - No data to display       Imaging Results    None          Medications   rabies vaccine, PCEC injection 2.5 Units (2.5 Units Intramuscular Given 2/7/23 0655)                 ED Course as of 02/07/23 1926 Tue Feb 07, 2023 0629 MDM: I have independently  evaluated and interpreted all available labs and imaging.  The suspected diagnosis, treatment and plan were discussed with the patient. All questions or concerns have been addressed.      After review of the patient's physical exam, ED testing, and history/symptoms, relevant labs, imaging, available outside records  a wide differential was considered including but not limited to: infectious, traumatic, vascular, toxicological , malignant, ischemic, embolic, psychological, genetic, iatrogenic, idiopathic, substance dependence/intoxication/withdrawal, electrolyte or blood dyscrasia, and other etiologies.    Patient presenting for his 2nd rabies vaccination shot.  He is on day 3.  He has been counseled to continue returning for the shots on day 7 and day 14.  He notes the wound has improved significantly.  No evidence of flexor tenosynovitis.  Wound is improving per patient and per what was documented 3 days ago.  Mildly hypertensive but otherwise vitals are stable.  Denies any symptoms concerning for hypertensive emergency.  Notes some pain in the area that is controllable with outpatient medications.  He is on Augmentin.  Counseled to continue taking Augmentin.  No signs concerning for rabies illness.  Patient was bit by a stray dog and he does not have the dog in custody so will continue needing to get prophylaxis.  Strict return precautions given. I discussed with the patient/family the diagnosis, treatment plan, indications for return to the emergency department, and for expected follow-up. The patient/family verbalized an understanding. The patient/family is asked if there are any questions or concerns. We discuss the case, until all issues are addressed to the patient/family's satisfaction. Patient/family understands and is agreeable to the plan. Patient is stable and ready for discharge.      [CC]      ED Course User Index  [CC] Damon Jonas MD                 Clinical Impression:   Final  diagnoses:  [Z23] Need for rabies vaccination (Primary)  [Z23] Encounter for repeat administration of rabies vaccination  [Z51.89] Visit for wound check        ED Disposition Condition    Discharge Stable          ED Prescriptions    None       Follow-up Information       Follow up With Specialties Details Why Contact Info    David Hannah MD Family Medicine Schedule an appointment as soon as possible for a visit in 2 days  [address] [place] [address]  [phone]      [facility] Emergency Dept Emergency Medicine Go in 4 days For repeat vaccination. [address] [place] [id number]  [phone]             Damon Jonas MD  02/07/23 1926

## 2023-02-11 ENCOUNTER — HOSPITAL ENCOUNTER (EMERGENCY)
Facility: HOSPITAL | Age: 48
Discharge: HOME | End: 2023-02-11
Attending: EMERGENCY MEDICINE
Payer: COMMERCIAL

## 2023-02-11 VITALS
SYSTOLIC BLOOD PRESSURE: 172 MMHG | BODY MASS INDEX: 30.8 KG/M2 | OXYGEN SATURATION: 99 % | WEIGHT: 220 LBS | TEMPERATURE: 98 F | RESPIRATION RATE: 18 BRPM | HEIGHT: 71 IN | DIASTOLIC BLOOD PRESSURE: 117 MMHG | HEART RATE: 64 BPM
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
## 2023-02-11 DIAGNOSIS — Z23 NEED FOR RABIES VACCINATION: Primary | ICD-10-CM

## 2023-02-11 DIAGNOSIS — S61.259D DOG BITE OF FINGER, SUBSEQUENT ENCOUNTER: ICD-10-CM

## 2023-02-11 PROCEDURE — 90675 RABIES VACCINE IM: CPT | Performed by: EMERGENCY MEDICINE

## 2023-02-11 PROCEDURE — 63600175 PHARM REV CODE 636 W HCPCS: Performed by: EMERGENCY MEDICINE

## 2023-02-11 PROCEDURE — 99283 EMERGENCY DEPT VISIT LOW MDM: CPT | Mod: 59

## 2023-02-11 PROCEDURE — 90471 IMMUNIZATION ADMIN: CPT | Performed by: EMERGENCY MEDICINE

## 2023-02-11 RX ORDER — AMOXICILLIN AND CLAVULANATE POTASSIUM 875; 125 MG/1; MG/1
1 TABLET, FILM COATED ORAL 2 TIMES DAILY
Qty: 14 TABLET | Refills: 0 | Status: SHIPPED | OUTPATIENT
Start: 2023-02-11

## 2023-02-11 RX ADMIN — RABIES VACCINE 2.5 UNITS: KIT at 07:02

## 2023-02-11 NOTE — DISCHARGE INSTRUCTIONS
An additional week of antibiotics has been prescribed in case of any lingering infection pending reassessment as an outpatient.  Your last rabies vaccination will be in 1 week time.    As we discussed, return to the emergency department for new or worsening symptoms including worsening redness, fever, or pus drainage.

## 2023-02-11 NOTE — ED PROVIDER NOTES
Chief complaint:  return for rabies shot (Pt is having rabies series and is here for the 4th injection)      HPI:  Slava Kamara is a 48 y.o. male presenting with dog bite to his left 2nd finger from a stray dog on Augmentin with improving wound.  He is here for 3rd rabies vaccination on day 7.  He denies worsening pain, purulent drainage, worsening swelling or range of motion.  Redness has improved as well.  He does have some stiffness in the finger with difficulty fully flexing it since the injury.  He remains on Augmentin pending outpatient follow-up with PCP.    ROS: As per HPI and below:  No fever.    Review of patient's allergies indicates:   Allergen Reactions    Sulfa (sulfonamide antibiotics) Anaphylaxis    Bee sting [allergen ext-venom-honey bee]        Current Discharge Medication List        CONTINUE these medications which have CHANGED    Details   amoxicillin-clavulanate 875-125mg (AUGMENTIN) 875-125 mg per tablet Take 1 tablet by mouth 2 (two) times daily.  Qty: 14 tablet, Refills: 0           CONTINUE these medications which have NOT CHANGED    Details   amLODIPine (NORVASC) 10 MG tablet Take 1 tablet (10 mg total) by mouth once daily.  Qty: 90 tablet, Refills: 3    Comments: .  Associated Diagnoses: Essential (primary) hypertension      chlorthalidone (HYGROTEN) 25 MG Tab Take 1 tablet (25 mg total) by mouth once daily.  Qty: 30 tablet, Refills: 0    Comments: .No further refills until patient schedules an appointment      lisinopriL (PRINIVIL,ZESTRIL) 40 MG tablet Take 0.5 tablets (20 mg total) by mouth once daily.  Qty: 90 tablet, Refills: 3    Comments: .  Associated Diagnoses: Essential (primary) hypertension      traZODone (DESYREL) 50 MG tablet Take 1 tablet (50 mg total) by mouth every evening.  Qty: 90 tablet, Refills: 3    Associated Diagnoses: Primary insomnia             PMH:  As per HPI and below:  Past Medical History:   Diagnosis Date    Anxiety     Depression     Hypertension      Kidney stones     stones    Snoring     patient reports suspected HARJIT, needs sleep study     Past Surgical History:   Procedure Laterality Date    APPENDECTOMY      CYSTOSCOPY W/ LASER LITHOTRIPSY      EXTRACORPOREAL SHOCK WAVE LITHOTRIPSY  1996, 10/4/2016    KNEE SURGERY         Social History     Socioeconomic History    Marital status:    Tobacco Use    Smoking status: Never    Smokeless tobacco: Never   Substance and Sexual Activity    Alcohol use: No    Drug use: No       Family History   Problem Relation Age of Onset    Heart disease Mother     Stroke Mother     Heart attack Mother     Heart disease Father     Stroke Father     Heart attack Father        Physical Exam:    Vitals:    02/11/23 0636   BP: (!) 172/117   Pulse:    Resp:    Temp:      GENERAL:  No apparent distress.  Alert.    HEENT:  Moist mucous membranes.  Normocephalic and atraumatic.    NECK:  No swelling.  Midline trachea.   CARDIOVASCULAR:  Regular rate and rhythm.  2+ radial pulses.    PULMONARY:  Lungs clear to auscultation bilaterally.  No wheezes, rales, or rhonci.    EXTREMITIES:  Warm and well perfused.  Brisk capillary refill.  Wound over left 2nd finger adjacent to PIP.  Mild edema immediately around site of wound.  Minimal erythema.  Minimal tenderness.  No purulent drainage.  He is not able to fully actively or passively flex at the PIP.  NEUROLOGICAL:  Normal mental status.  Appropriate and conversant.  Decreased distal sensation light touch of the left 2nd finger compared to opposite side.  5/5 strength.  SKIN:  L 2nd finger wound as pictured and described above.              Labs Reviewed - No data to display    Current Discharge Medication List        CONTINUE these medications which have CHANGED    Details   amoxicillin-clavulanate 875-125mg (AUGMENTIN) 875-125 mg per tablet Take 1 tablet by mouth 2 (two) times daily.  Qty: 14 tablet, Refills: 0           CONTINUE these medications which have NOT CHANGED    Details    amLODIPine (NORVASC) 10 MG tablet Take 1 tablet (10 mg total) by mouth once daily.  Qty: 90 tablet, Refills: 3    Comments: .  Associated Diagnoses: Essential (primary) hypertension      chlorthalidone (HYGROTEN) 25 MG Tab Take 1 tablet (25 mg total) by mouth once daily.  Qty: 30 tablet, Refills: 0    Comments: .No further refills until patient schedules an appointment      lisinopriL (PRINIVIL,ZESTRIL) 40 MG tablet Take 0.5 tablets (20 mg total) by mouth once daily.  Qty: 90 tablet, Refills: 3    Comments: .  Associated Diagnoses: Essential (primary) hypertension      traZODone (DESYREL) 50 MG tablet Take 1 tablet (50 mg total) by mouth every evening.  Qty: 90 tablet, Refills: 3    Associated Diagnoses: Primary insomnia             No orders of the defined types were placed in this encounter.      Imaging Results    None              MDM:    48 y.o. male with left 2nd finger dog bite.  This shows continued improvement although with some restriction to range of motion.  Full extent course of antibiotics an additional week pending outpatient follow-up.  I doubt septic joint.  I have a low suspicion for flexor tenosynovitis.  I do not think emergent transfer for surgical intervention or admission for IV antibiotics is indicated..  X-ray reviewed with possible calcific density noted patient to be discussed for monitoring or further workup on follow-up.  Hand specialist referral given for reassessment in addition to PCP as well.  Rabies vaccine given for today with last dose in 1 week.  Detailed return precautions reviewed.    Diagnoses:   1. Rabies vaccination  2.  Dog bite left 2nd finger, subsequent visit       Imtiaz Golden MD  02/11/23 9962

## 2023-07-28 DIAGNOSIS — I10 ESSENTIAL (PRIMARY) HYPERTENSION: ICD-10-CM

## 2023-07-28 DIAGNOSIS — F51.01 PRIMARY INSOMNIA: ICD-10-CM

## 2023-07-28 RX ORDER — LISINOPRIL 40 MG/1
20 TABLET ORAL DAILY
Qty: 30 TABLET | Refills: 0 | Status: SHIPPED | OUTPATIENT
Start: 2023-07-28

## 2023-07-28 RX ORDER — TRAZODONE HYDROCHLORIDE 50 MG/1
50 TABLET ORAL NIGHTLY
Qty: 30 TABLET | Refills: 0 | Status: SHIPPED | OUTPATIENT
Start: 2023-07-28

## 2023-07-28 RX ORDER — AMLODIPINE BESYLATE 10 MG/1
10 TABLET ORAL DAILY
Qty: 30 TABLET | Refills: 0 | Status: SHIPPED | OUTPATIENT
Start: 2023-07-28

## 2023-07-28 RX ORDER — CHLORTHALIDONE 25 MG/1
25 TABLET ORAL DAILY
Qty: 30 TABLET | Refills: 0 | Status: CANCELLED | OUTPATIENT
Start: 2023-07-28 | End: 2024-07-27

## 2023-07-28 NOTE — TELEPHONE ENCOUNTER
Message sent to [facility] staff to schedule with new cardiologist. 1 month supply of medication requested until established elsewhere.

## 2023-07-28 NOTE — TELEPHONE ENCOUNTER
Care Due:                  Date            Visit Type   Department     Provider  --------------------------------------------------------------------------------                                NP -                              PRIMARY      [facility] FAMILY  Last Visit: 06-      CARE ([facility])   MEDICINE       David Hannah  Next Visit: None Scheduled  None         None Found                                                            Last  Test          Frequency    Reason                     Performed    Due Date  --------------------------------------------------------------------------------    Office Visit  12 months..  amLODIPine, lisinopriL,    06- 06-                             traZODone................    [employer] Embedded Care Due Messages. Reference number: [id number].   7/28/2023 12:33:15 PM CDT

## 2023-07-28 NOTE — TELEPHONE ENCOUNTER
----- Message from Sharmaine Mckeon MA sent at 7/28/2023  1:24 PM CDT -----  Please assist patient with getting established with a different cardiologist. Thanks in advance

## 2023-09-19 ENCOUNTER — PATIENT MESSAGE (OUTPATIENT)
Dept: ADMINISTRATIVE | Facility: HOSPITAL | Age: 48
End: 2023-09-19
Payer: COMMERCIAL

## 2024-06-13 ENCOUNTER — HOSPITAL ENCOUNTER (EMERGENCY)
Facility: HOSPITAL | Age: 49
Discharge: HOME | End: 2024-06-13
Attending: EMERGENCY MEDICINE
Payer: COMMERCIAL

## 2024-06-13 VITALS
OXYGEN SATURATION: 97 % | TEMPERATURE: 99 F | RESPIRATION RATE: 16 BRPM | SYSTOLIC BLOOD PRESSURE: 171 MMHG | DIASTOLIC BLOOD PRESSURE: 100 MMHG | HEART RATE: 74 BPM | WEIGHT: 230 LBS | BODY MASS INDEX: 32.2 KG/M2 | HEIGHT: 71 IN

## 2024-06-13 DIAGNOSIS — S16.1XXA CERVICAL STRAIN, ACUTE, INITIAL ENCOUNTER: Primary | ICD-10-CM

## 2024-06-13 DIAGNOSIS — I10 HYPERTENSION, UNCONTROLLED: ICD-10-CM

## 2024-06-13 DIAGNOSIS — M54.2 NECK PAIN ON LEFT SIDE: ICD-10-CM

## 2024-06-13 PROCEDURE — 96372 THER/PROPH/DIAG INJ SC/IM: CPT

## 2024-06-13 PROCEDURE — 63600175 PHARM REV CODE 636 W HCPCS: Mod: ER

## 2024-06-13 PROCEDURE — 25000003 PHARM REV CODE 250: Mod: ER

## 2024-06-13 PROCEDURE — 99284 EMERGENCY DEPT VISIT MOD MDM: CPT | Mod: 25,ER

## 2024-06-13 RX ORDER — KETOROLAC TROMETHAMINE 30 MG/ML
15 INJECTION, SOLUTION INTRAMUSCULAR; INTRAVENOUS
Status: COMPLETED | OUTPATIENT
Start: 2024-06-13 | End: 2024-06-13

## 2024-06-13 RX ORDER — METHOCARBAMOL 500 MG/1
1000 TABLET, FILM COATED ORAL 3 TIMES DAILY PRN
Qty: 30 TABLET | Refills: 0 | Status: SHIPPED | OUTPATIENT
Start: 2024-06-13

## 2024-06-13 RX ORDER — AMLODIPINE BESYLATE 10 MG/1
10 TABLET ORAL DAILY
Qty: 30 TABLET | Refills: 0 | Status: SHIPPED | OUTPATIENT
Start: 2024-06-13 | End: 2024-07-13

## 2024-06-13 RX ORDER — LISINOPRIL 20 MG/1
40 TABLET ORAL
Status: COMPLETED | OUTPATIENT
Start: 2024-06-13 | End: 2024-06-13

## 2024-06-13 RX ORDER — LISINOPRIL 10 MG/1
40 TABLET ORAL DAILY
Qty: 120 TABLET | Refills: 0 | Status: SHIPPED | OUTPATIENT
Start: 2024-06-13 | End: 2024-07-13

## 2024-06-13 RX ORDER — AMLODIPINE BESYLATE 5 MG/1
10 TABLET ORAL
Status: COMPLETED | OUTPATIENT
Start: 2024-06-13 | End: 2024-06-13

## 2024-06-13 RX ORDER — LIDOCAINE 50 MG/G
3 PATCH TOPICAL
Status: DISCONTINUED | OUTPATIENT
Start: 2024-06-13 | End: 2024-06-13 | Stop reason: HOSPADM

## 2024-06-13 RX ORDER — NAPROXEN 500 MG/1
500 TABLET ORAL 2 TIMES DAILY PRN
Qty: 30 TABLET | Refills: 0 | Status: SHIPPED | OUTPATIENT
Start: 2024-06-13

## 2024-06-13 RX ORDER — LIDOCAINE 50 MG/G
1 PATCH TOPICAL DAILY
Qty: 15 PATCH | Refills: 0 | Status: SHIPPED | OUTPATIENT
Start: 2024-06-13

## 2024-06-13 RX ADMIN — LIDOCAINE 3 PATCH: 700 PATCH TOPICAL at 10:06

## 2024-06-13 RX ADMIN — AMLODIPINE BESYLATE 10 MG: 5 TABLET ORAL at 10:06

## 2024-06-13 RX ADMIN — KETOROLAC TROMETHAMINE 15 MG: 30 INJECTION, SOLUTION INTRAMUSCULAR at 10:06

## 2024-06-13 RX ADMIN — LISINOPRIL 40 MG: 20 TABLET ORAL at 10:06

## 2024-06-13 NOTE — ED PROVIDER NOTES
Encounter Date: 6/13/2024    SCRIBE #1 NOTE: I, Cande Li, am scribing for, and in the presence of,  Alayna Holdsworth, PA-C.       History     Chief Complaint   Patient presents with    Neck Pain     Patient presents w/ a c/o of right neck, and shoulder pain for three days. Unable to turn his neck to the right. Denies any injury or trauma.     48 yo M w/ PMHx of anxiety, depression, HTN, presenting to the ED for left sided posterior neck/shoulder pain. Patient reports 3 days ago, he started experiencing L posterior shoulder pain, worsening since to his L posterior neck and L upper posterior arm. He reports stabbing pain to his neck and throbbing pain to his arm. He reports his pain is constant and rating 7/10 in severity. He reports worsening pain w/ neck and head movement. Attempted tx w/ ibuprofen. He also reports ongoing headaches, sinus pain and pressure d/t a sinus infection for the past week, no worsening sx today. He denies any recent trauma, injuries, falls, heavy lifting, but has been sleeping sitting up in his van as he is working in town for the week. No other exacerbating or alleviating factors. Denies fever, nausea, vomiting, trouble swallowing, sore throat, facial swelling, swelling or color change, paresthesias or other associated symptoms. He admits to nonadherence w/ his antihypertensives as he ran out and never refilled it w/ his PCP (pt was previously prescribed Amlodipine 10 mg qd and Lisinopril 40 mg qd). He denies any associated chest pain, dyspnea, severe headache, lightheadedness/dizziness, unilateral weakness or paresthesias, other neurologic deficits.     The history is provided by the patient.     Review of patient's allergies indicates:   Allergen Reactions    Sulfa (sulfonamide antibiotics) Anaphylaxis    Bee sting [allergen ext-venom-honey bee]      Past Medical History:   Diagnosis Date    Anxiety     Depression     Hypertension     Kidney stones     stones    Snoring     patient  reports suspected HARJIT, needs sleep study     Past Surgical History:   Procedure Laterality Date    APPENDECTOMY      CYSTOSCOPY W/ LASER LITHOTRIPSY      EXTRACORPOREAL SHOCK WAVE LITHOTRIPSY  1996, 10/4/2016    KNEE SURGERY       Family History   Problem Relation Name Age of Onset    Heart disease Mother      Stroke Mother      Heart attack Mother      Heart disease Father      Stroke Father      Heart attack Father       Social History     Tobacco Use    Smoking status: Never    Smokeless tobacco: Never   Substance Use Topics    Alcohol use: No    Drug use: No     Review of Systems   Constitutional:  Negative for chills, diaphoresis and fever.   HENT:  Positive for sinus pressure and sinus pain. Negative for hearing loss, sore throat and trouble swallowing.    Eyes:  Negative for photophobia and visual disturbance.   Respiratory:  Negative for cough and shortness of breath.    Cardiovascular:  Negative for chest pain.   Gastrointestinal:  Negative for nausea and vomiting.   Musculoskeletal:  Positive for neck pain (L sided, radiating to L shoulder and L upper arm). Negative for back pain.   Skin:  Negative for color change and rash.   Neurological:  Positive for headaches. Negative for dizziness, syncope, facial asymmetry, speech difficulty, weakness, light-headedness and numbness.       Physical Exam     Initial Vitals [06/13/24 0938]   BP Pulse Resp Temp SpO2   (S) (!) 157/122 88 20 97.5 °F (36.4 °C) 98 %      MAP       --         Physical Exam    Nursing note and vitals reviewed.  Constitutional: He appears well-developed and well-nourished. He is not diaphoretic. He is active. He does not appear ill. No distress.   HENT:   Head: Normocephalic and atraumatic.   Right Ear: External ear normal.   Left Ear: External ear normal.   Nose: Nose normal.   Mouth/Throat: Uvula is midline, oropharynx is clear and moist and mucous membranes are normal.   Eyes: Conjunctivae and lids are normal. Pupils are equal, round, and  reactive to light. Right eye exhibits no discharge. Left eye exhibits no discharge. No scleral icterus.   Neck: Phonation normal. Neck supple. No stridor present.   No drooling, no hot potato voice, no neck edema or erythema, and normal neck ROM.   Normal range of motion.  Cardiovascular:  Normal rate and regular rhythm.           Pulses:       Radial pulses are 2+ on the right side and 2+ on the left side.   Pulmonary/Chest: Effort normal and breath sounds normal. No respiratory distress.   Abdominal: He exhibits no distension.   Musculoskeletal:         General: Normal range of motion.      Right shoulder: Tenderness (posterior \) present. No swelling, deformity or bony tenderness. Normal range of motion.      Right upper arm: Tenderness (posterior triceps) present. No swelling, edema or deformity.      Right elbow: Normal.      Right forearm: Normal.      Right wrist: Normal.      Right hand: Normal.      Cervical back: Normal range of motion and neck supple. No swelling, edema, deformity, erythema, rigidity or bony tenderness. Pain with movement and muscular tenderness (left) present. No spinous process tenderness.      Thoracic back: Normal.      Lumbar back: Normal.        Back:       Comments: Normal range of motion with pain.  Neurovascularly intact.  No erythema, warmth, swelling, or deformities appreciated.  No bony tenderness.  No midline cervical tenderness.     Neurological: He is alert and oriented to person, place, and time. He has normal strength. No sensory deficit. GCS eye subscore is 4. GCS verbal subscore is 5. GCS motor subscore is 6.   Skin: Skin is dry. Capillary refill takes less than 2 seconds.         ED Course   Procedures  Labs Reviewed - No data to display       Imaging Results    None          Medications   LIDOcaine 5 % patch 3 patch (3 patches Transdermal Patch Applied 6/13/24 1005)   lisinopriL tablet 40 mg (40 mg Oral Given 6/13/24 1004)   amLODIPine tablet 10 mg (10 mg Oral Given  6/13/24 1004)   ketorolac injection 15 mg (15 mg Intramuscular Given 6/13/24 1004)     Medical Decision Making  50 yo M w/ PMHx of anxiety, depression, HTN, presenting to the ED for left sided posterior neck/shoulder pain.   Patient's chart and medical history reviewed.    Ddx:  Fracture  Dislocation  Contusion  Sprain  MI  Strep pharyngitis  Viral pharyngitis  Arturo's angina  Retropharyngeal abscess  Tonsillar abscess    Patient's vitals reviewed.  Afebrile, no respiratory distress, and nontoxic-appearing in the ED. Patient had left-sided posterior neck, left-sided posterior shoulder, and left-sided triceps tenderness to palpation. Normal range of motion with pain.  Neurovascularly intact.  No erythema, warmth, swelling, or deformities appreciated.  No bony tenderness.  No midline cervical tenderness.  Normal oropharyngeal exam. No drooling, no hot potato voice, no neck edema or erythema, and normal neck ROM.  Patient given Toradol and lidocaine patch pain.  Patient given his home dose of BP medicine.  Patient is noncompliant with a small pressure medicine.  Patient denies any current symptoms.  Unlikely ACS. Consider but unlikely Arturo's angina tonsillar abscess, strep pharyngitis, and retropharyngeal abscess after history and physical exam; no stridor, drooling, trismus, neck tenderness on palpation, and neck pain with ROM.  Discussed with patient this is most likely musculoskeletal in nature possibly from sleeping in his van.  Instructed patient to rest, ice, and heat.  Patient was sent home with naproxen, lidocaine patches, and Robaxin for symptomatic control.  I will refill the patient's blood pressure medicine.  He will follow up with his PCP. Patient agrees with this plan. Discussed with him strict return precautions, he verbalized understanding. Patient is stable for discharge.     Amount and/or Complexity of Data Reviewed  External Data Reviewed: labs, radiology and notes.    Risk  Prescription drug  management.            Scribe Attestation:   Scribe #1: I performed the above scribed service and the documentation accurately describes the services I performed. I attest to the accuracy of the note.                           I, Alayna Holdsworth,PA-C, personally performed the services described in this documentation. All medical record entries made by the scribe were at my direction and in my presence. I have reviewed the chart and agree that the record reflects my personal performance and is accurate and complete.      Clinical Impression:  Final diagnoses:  [S16.1XXA] Cervical strain, acute, initial encounter - left (Primary)  [M54.2] Neck pain on left side  [I10] Hypertension, uncontrolled          ED Disposition Condition    Discharge Stable          ED Prescriptions       Medication Sig Dispense Start Date End Date Auth. Provider    amLODIPine (NORVASC) 10 MG tablet Take 1 tablet (10 mg total) by mouth once daily. 30 tablet 6/13/2024 7/13/2024 Holdsworth, Alayna, PA-C    lisinopriL 10 MG tablet Take 4 tablets (40 mg total) by mouth once daily. 120 tablet 6/13/2024 7/13/2024 Holdsworth, Alayna, PA-C    methocarbamoL (ROBAXIN) 500 MG Tab Take 2 tablets (1,000 mg total) by mouth 3 (three) times daily as needed (Pain or muscle spasms). 30 tablet 6/13/2024 -- Holdsworth, Alayna, PA-C    naproxen (NAPROSYN) 500 MG tablet Take 1 tablet (500 mg total) by mouth 2 (two) times daily as needed (Pain). 30 tablet 6/13/2024 -- Holdsworth, Alayna, PA-C    LIDOcaine (LIDODERM) 5 % Place 1 patch onto the skin once daily. Remove & Discard patch within 12 hours then leave off for 12 hours 15 patch 6/13/2024 -- Holdsworth, Alayna, PA-C          Follow-up Information       Follow up With Specialties Details Why Contact Info    David Hannah MD Family Medicine Schedule an appointment as soon as possible for a visit   [address] [place] [address]  [phone]               Holdsworth, Alayna, PA-C  06/13/24 2695

## 2024-06-13 NOTE — DISCHARGE INSTRUCTIONS

## 2024-12-09 ENCOUNTER — OFFICE VISIT (OUTPATIENT)
Dept: FAMILY MEDICINE | Facility: CLINIC | Age: 49
End: 2024-12-09
Payer: COMMERCIAL

## 2024-12-09 DIAGNOSIS — J01.90 ACUTE SINUSITIS, RECURRENCE NOT SPECIFIED, UNSPECIFIED LOCATION: Primary | ICD-10-CM

## 2024-12-09 PROCEDURE — 99213 OFFICE O/P EST LOW 20 MIN: CPT | Mod: 95,,, | Performed by: NURSE PRACTITIONER

## 2024-12-09 RX ORDER — AMOXICILLIN AND CLAVULANATE POTASSIUM 875; 125 MG/1; MG/1
1 TABLET, FILM COATED ORAL EVERY 12 HOURS
Qty: 14 TABLET | Refills: 0 | Status: SHIPPED | OUTPATIENT
Start: 2024-12-09 | End: 2024-12-16

## 2024-12-09 RX ORDER — FLUTICASONE PROPIONATE 50 MCG
2 SPRAY, SUSPENSION (ML) NASAL DAILY
Qty: 16 G | Refills: 0 | Status: SHIPPED | OUTPATIENT
Start: 2024-12-09

## 2024-12-09 RX ORDER — METHYLPREDNISOLONE 4 MG/1
TABLET ORAL
Qty: 21 EACH | Refills: 0 | Status: SHIPPED | OUTPATIENT
Start: 2024-12-09 | End: 2024-12-30

## 2024-12-09 NOTE — PROGRESS NOTES
The patient location is: [place], [place]  The chief complaint leading to consultation is: sinus pressure/nasal congestion  Visit type: Virtual visit with synchronous audio and video  Total time spent with patient: 15  Each patient to whom he or she provides medical services by telemedicine is:  (1) informed of the relationship between the physician and patient and the respective role of any other health care provider with respect to management of the patient; and (2) notified that he or she may decline to receive medical services by telemedicine and may withdraw from such care at any time.    Subjective:       Patient ID: Slava Kamara is a 49 y.o. male.    Chief Complaint: Sinus Problem    HPI  50 y/o M with AR, Depression, ZACH, HARJIT, HTN presents via virtual visit for c/o HA, sinus pain/pressure, nasal congestion, post nasal drip, coughing up and blowing out tan mucous from his sinuses. Symptoms have been ongoing for several days. Denies fever/chills/body aches. Unable to come in to clinic. He is stuck at work until 5am tomorrow. He has been taking benadryl and theraflu prn.     Past Medical History:   Diagnosis Date    Anxiety     Depression     Hypertension     Kidney stones     stones    Snoring     patient reports suspected HARJIT, needs sleep study       Review of patient's allergies indicates:   Allergen Reactions    Sulfa (sulfonamide antibiotics) Anaphylaxis    Bee sting [allergen ext-venom-honey bee]        Current Outpatient Medications on File Prior to Visit   Medication Sig Dispense Refill    amLODIPine (NORVASC) 10 MG tablet Take 1 tablet (10 mg total) by mouth once daily. 30 tablet 0    chlorthalidone (HYGROTEN) 25 MG Tab Take 1 tablet (25 mg total) by mouth once daily. 30 tablet 0    LIDOcaine (LIDODERM) 5 % Place 1 patch onto the skin once daily. Remove & Discard patch within 12 hours then leave off for 12 hours 15 patch 0    lisinopriL (PRINIVIL,ZESTRIL) 40 MG tablet Take 0.5 tablets (20 mg total)  by mouth once daily. 30 tablet 0    methocarbamoL (ROBAXIN) 500 MG Tab Take 2 tablets (1,000 mg total) by mouth 3 (three) times daily as needed (Pain or muscle spasms). 30 tablet 0    naproxen (NAPROSYN) 500 MG tablet Take 1 tablet (500 mg total) by mouth 2 (two) times daily as needed (Pain). 30 tablet 0    traZODone (DESYREL) 50 MG tablet Take 1 tablet (50 mg total) by mouth every evening. 30 tablet 0    [DISCONTINUED] amoxicillin-clavulanate 875-125mg (AUGMENTIN) 875-125 mg per tablet Take 1 tablet by mouth 2 (two) times daily. 14 tablet 0     No current facility-administered medications on file prior to visit.       Review of Systems   Constitutional:  Negative for activity change and unexpected weight change.   HENT:  Positive for nasal congestion, postnasal drip, rhinorrhea and sinus pressure/congestion. Negative for hearing loss and trouble swallowing.    Eyes:  Negative for discharge and visual disturbance.   Respiratory:  Positive for chest tightness and wheezing.    Cardiovascular:  Positive for chest pain. Negative for palpitations.   Gastrointestinal:  Negative for blood in stool, constipation, diarrhea and vomiting.   Endocrine: Negative for polydipsia.   Genitourinary:  Negative for difficulty urinating, hematuria and urgency.   Musculoskeletal:  Positive for arthralgias and neck pain.   Neurological:  Positive for headaches. Negative for weakness.   Psychiatric/Behavioral:  Negative for confusion.         Objective:      No acute distress noted. AAOX3.    Assessment:       1. Acute sinusitis, recurrence not specified, unspecified location        Plan:       1. Acute sinusitis, recurrence not specified, unspecified location  - amoxicillin-clavulanate 875-125mg (AUGMENTIN) 875-125 mg per tablet; Take 1 tablet by mouth every 12 (twelve) hours. for 7 days  Dispense: 14 tablet; Refill: 0  - methylPREDNISolone (MEDROL DOSEPACK) 4 mg tablet; use as directed  Dispense: 21 each; Refill: 0  - fluticasone  propionate (FLONASE) 50 mcg/actuation nasal spray; 2 sprays (100 mcg total) by Each Nostril route once daily.  Dispense: 16 g; Refill: 0       Discussed if no improvement or new/worsening symptoms, need to come in to clinic. Pt verbalized understanding.

## 2025-01-10 ENCOUNTER — HOSPITAL ENCOUNTER (EMERGENCY)
Facility: HOSPITAL | Age: 50
Discharge: HOME | End: 2025-01-10
Attending: EMERGENCY MEDICINE
Payer: COMMERCIAL

## 2025-01-10 VITALS
SYSTOLIC BLOOD PRESSURE: 161 MMHG | OXYGEN SATURATION: 99 % | BODY MASS INDEX: 32.82 KG/M2 | TEMPERATURE: 98 F | HEART RATE: 70 BPM | WEIGHT: 234.44 LBS | RESPIRATION RATE: 16 BRPM | DIASTOLIC BLOOD PRESSURE: 102 MMHG | HEIGHT: 71 IN

## 2025-01-10 DIAGNOSIS — L03.116 LEFT LEG CELLULITIS: Primary | ICD-10-CM

## 2025-01-10 DIAGNOSIS — M79.89 LEFT LEG SWELLING: ICD-10-CM

## 2025-01-10 LAB
ALBUMIN SERPL BCP-MCNC: 3.5 G/DL (ref 3.5–5.2)
ALP SERPL-CCNC: 147 U/L (ref 40–150)
ALT SERPL W/O P-5'-P-CCNC: 17 U/L (ref 10–44)
ANION GAP SERPL CALC-SCNC: 7 MMOL/L (ref 8–16)
AST SERPL-CCNC: 16 U/L (ref 10–40)
BASOPHILS # BLD AUTO: 0.08 K/UL (ref 0–0.2)
BASOPHILS NFR BLD: 0.9 % (ref 0–1.9)
BILIRUB SERPL-MCNC: 0.7 MG/DL (ref 0.1–1)
BUN SERPL-MCNC: 16 MG/DL (ref 6–20)
CALCIUM SERPL-MCNC: 8.9 MG/DL (ref 8.7–10.5)
CHLORIDE SERPL-SCNC: 107 MMOL/L (ref 95–110)
CO2 SERPL-SCNC: 26 MMOL/L (ref 23–29)
CREAT SERPL-MCNC: 1.1 MG/DL (ref 0.5–1.4)
DIFFERENTIAL METHOD BLD: ABNORMAL
EOSINOPHIL # BLD AUTO: 0.2 K/UL (ref 0–0.5)
EOSINOPHIL NFR BLD: 2.5 % (ref 0–8)
ERYTHROCYTE [DISTWIDTH] IN BLOOD BY AUTOMATED COUNT: 13.7 % (ref 11.5–14.5)
EST. GFR  (NO RACE VARIABLE): >60 ML/MIN/1.73 M^2
GLUCOSE SERPL-MCNC: 120 MG/DL (ref 70–110)
HCT VFR BLD AUTO: 41.5 % (ref 40–54)
HCV AB SERPL QL IA: NEGATIVE
HGB BLD-MCNC: 13.4 G/DL (ref 14–18)
HIV 1+2 AB+HIV1 P24 AG SERPL QL IA: NEGATIVE
IMM GRANULOCYTES # BLD AUTO: 0.13 K/UL (ref 0–0.04)
IMM GRANULOCYTES NFR BLD AUTO: 1.5 % (ref 0–0.5)
LYMPHOCYTES # BLD AUTO: 1.8 K/UL (ref 1–4.8)
LYMPHOCYTES NFR BLD: 21.7 % (ref 18–48)
MCH RBC QN AUTO: 27.4 PG (ref 27–31)
MCHC RBC AUTO-ENTMCNC: 32.3 G/DL (ref 32–36)
MCV RBC AUTO: 85 FL (ref 82–98)
MONOCYTES # BLD AUTO: 1 K/UL (ref 0.3–1)
MONOCYTES NFR BLD: 11.5 % (ref 4–15)
NEUTROPHILS # BLD AUTO: 5.2 K/UL (ref 1.8–7.7)
NEUTROPHILS NFR BLD: 61.9 % (ref 38–73)
NRBC BLD-RTO: 0 /100 WBC
PLATELET # BLD AUTO: 226 K/UL (ref 150–450)
PMV BLD AUTO: 11.6 FL (ref 9.2–12.9)
POTASSIUM SERPL-SCNC: 3.8 MMOL/L (ref 3.5–5.1)
PROT SERPL-MCNC: 6.5 G/DL (ref 6–8.4)
RBC # BLD AUTO: 4.89 M/UL (ref 4.6–6.2)
SODIUM SERPL-SCNC: 140 MMOL/L (ref 136–145)
WBC # BLD AUTO: 8.44 K/UL (ref 3.9–12.7)

## 2025-01-10 PROCEDURE — 87389 HIV-1 AG W/HIV-1&-2 AB AG IA: CPT | Performed by: FAMILY MEDICINE

## 2025-01-10 PROCEDURE — 96365 THER/PROPH/DIAG IV INF INIT: CPT

## 2025-01-10 PROCEDURE — 94760 N-INVAS EAR/PLS OXIMETRY 1: CPT

## 2025-01-10 PROCEDURE — 86803 HEPATITIS C AB TEST: CPT | Performed by: FAMILY MEDICINE

## 2025-01-10 PROCEDURE — 36415 COLL VENOUS BLD VENIPUNCTURE: CPT | Performed by: EMERGENCY MEDICINE

## 2025-01-10 PROCEDURE — 25000003 PHARM REV CODE 250: Performed by: EMERGENCY MEDICINE

## 2025-01-10 PROCEDURE — 80053 COMPREHEN METABOLIC PANEL: CPT | Performed by: EMERGENCY MEDICINE

## 2025-01-10 PROCEDURE — 99285 EMERGENCY DEPT VISIT HI MDM: CPT | Mod: 25

## 2025-01-10 PROCEDURE — 36415 COLL VENOUS BLD VENIPUNCTURE: CPT | Performed by: FAMILY MEDICINE

## 2025-01-10 PROCEDURE — 87040 BLOOD CULTURE FOR BACTERIA: CPT | Mod: 59 | Performed by: EMERGENCY MEDICINE

## 2025-01-10 PROCEDURE — 85025 COMPLETE CBC W/AUTO DIFF WBC: CPT | Performed by: EMERGENCY MEDICINE

## 2025-01-10 PROCEDURE — 63600175 PHARM REV CODE 636 W HCPCS: Mod: JZ,TB | Performed by: EMERGENCY MEDICINE

## 2025-01-10 RX ORDER — LISINOPRIL 20 MG/1
20 TABLET ORAL DAILY
Qty: 90 TABLET | Refills: 3 | Status: SHIPPED | OUTPATIENT
Start: 2025-01-10 | End: 2026-01-10

## 2025-01-10 RX ADMIN — DALBAVANCIN 1500 MG: 500 INJECTION, POWDER, FOR SOLUTION INTRAVENOUS at 08:01

## 2025-01-10 NOTE — ED PROVIDER NOTES
Encounter Date: 1/10/2025       History     Chief Complaint   Patient presents with    Leg Pain     About a week ago hit in the calf on left leg and it left a knot. The knot has now resolved but calf in the last 3 days has become very swollen and traveled down all the way down to the ankle     49-year-old male history of anxiety depression hypertension presents with left lower extremity pain and swelling since Monday.  He was working on his porch and scraped his leg with a piece of wood.  Swelling has increased over the last several days.  His left lower leg feels hot according to the patient.  He does not feel sick.  Denies systemic complaints such as fevers or chills.  He has some baseline leg swelling due to amlodipine.  No chest pain or shortness of breath.  Patient works the night shift and has been on his feet all night long for the last several nights.    The history is provided by the patient.     Review of patient's allergies indicates:   Allergen Reactions    Sulfa (sulfonamide antibiotics) Anaphylaxis    Bee sting [allergen ext-venom-honey bee]      Past Medical History:   Diagnosis Date    Anxiety     Depression     Hypertension     Kidney stones     stones    Snoring     patient reports suspected HARJIT, needs sleep study     Past Surgical History:   Procedure Laterality Date    APPENDECTOMY      CYSTOSCOPY W/ LASER LITHOTRIPSY      EXTRACORPOREAL SHOCK WAVE LITHOTRIPSY  1996, 10/4/2016    KNEE SURGERY       Family History   Problem Relation Name Age of Onset    Heart disease Mother      Stroke Mother      Heart attack Mother      Heart disease Father      Stroke Father      Heart attack Father       Social History     Tobacco Use    Smoking status: Never    Smokeless tobacco: Never   Substance Use Topics    Alcohol use: No    Drug use: No     Review of Systems   Constitutional:  Negative for fever.   HENT:  Negative for sore throat.    Respiratory:  Negative for shortness of breath.    Cardiovascular:   Positive for leg swelling (L>R). Negative for chest pain.   Gastrointestinal:  Negative for nausea.   Genitourinary:  Negative for dysuria.   Musculoskeletal:  Negative for back pain.   Skin:  Negative for rash.   Neurological:  Negative for weakness.       Physical Exam     Initial Vitals [01/10/25 0612]   BP Pulse Resp Temp SpO2   (!) 179/109 74 16 98.2 °F (36.8 °C) 99 %      MAP       --         Physical Exam    Nursing note and vitals reviewed.  Constitutional: He appears well-developed and well-nourished. He is not diaphoretic.  Non-toxic appearance. He does not have a sickly appearance. He does not appear ill. No distress.   HENT:   Head: Normocephalic and atraumatic.   Eyes: EOM are normal.   Neck: Neck supple.   Normal range of motion.  Cardiovascular:  Normal rate, regular rhythm and normal heart sounds.     Exam reveals no gallop and no friction rub.       No murmur heard.  Pulmonary/Chest: Breath sounds normal. No respiratory distress. He has no wheezes. He has no rhonchi. He has no rales.   Musculoskeletal:         General: Normal range of motion.      Cervical back: Normal range of motion and neck supple. No rigidity. Normal range of motion.      Right lower le+ Edema present.      Left lower leg: 3+ Edema present.      Right ankle: Normal.      Left ankle: Normal.      Comments: Patient has swelling to both legs, left lower extremity is slightly more swollen than the left lower extremity from the shin to the toes.  He has some chronic venous stasis changes to the left lower extremity.  Left shin from the mid tibia to the ankle is erythematous and warm to the touch.    Normal range of motion to both ankles.  I do not note any effusion to the ankles.     Neurological: He is alert and oriented to person, place, and time.   Skin: Skin is warm and dry. No rash noted.   Psychiatric: He has a normal mood and affect. His behavior is normal. Judgment and thought content normal.         ED Course    Procedures  Labs Reviewed   HEPATITIS C ANTIBODY   HIV 1 / 2 ANTIBODY          Imaging Results              US Lower Extremity Veins Left (In process)                      Medications   dalbavancin (DALVANCE) 1,500 mg in D5W 325 mL infusion (has no administration in time range)     Medical Decision Making  0852 49-year-old male presents with left lower extremity swelling for several days after scraping his leg on a piece of wood earlier this week while working on his porch.  He has a left lower extremity cellulitis on exam.  The skin overlying the shin to the left leg is swollen, warm and erythematous.  He has bilateral lower extremity venous stasis changes and leg swelling likely secondary to prolonged periods of standing and p.o. Norvasc.  He can not see primary care for several more months.  Advised him to stop the amlodipine and start lisinopril.  Given IV Dalvance in the ER.  Labs and ultrasound are reassuring.  No systemic complaints.  Well-appearing on discharge.  Return precautions discussed with the patient.  He is able to verbalize these back to me.  He does not need admission at this time but if his swelling and other symptoms do not improve he might need to be admitted.  Advised to take several days off work.  States he can not do this and must go to work.  He states that he can stay in the office and elevate his leg. [EF]      Amount and/or Complexity of Data Reviewed  Labs: ordered. Decision-making details documented in ED Course.  Radiology:  Decision-making details documented in ED Course.    Risk  Prescription drug management.               ED Course as of 01/10/25 1511   Fri Milind 10, 2025   0702 BP(!): 179/109 [EF]   0702 Temp: 98.2 °F (36.8 °C) [EF]   0702 Temp Source: Oral [EF]   0702 Pulse: 74 [EF]   0702 Resp: 16 [EF]   0702 SpO2: 99 % [EF]   0724 Patient presents with several days of left lower extremity swelling after scraping his left leg with a piece of wood on Monday.  Probably has a  cellulitis.  Left lower leg is warm to the touch and tender.  Ultrasound pending.  Blood culture CBC CMP all pending IV Dalvance ordered.  If studies are reassuring he can be discharged home.   [EF]   0756 WBC: 8.44 [EF]   0756 Hemoglobin(!): 13.4 [EF]   0756 Platelet Count: 226 [EF]   0756 US Lower Extremity Veins Left [EF]   0759 Sodium: 140 [EF]   0759 Potassium: 3.8 [EF]   0759 Chloride: 107 [EF]   0759 CO2: 26 [EF]   0759 Glucose(!): 120 [EF]   0759 BUN: 16 [EF]   0759 Creatinine: 1.1 [EF]   0800 Calcium: 8.9 [EF]   0800 PROTEIN TOTAL: 6.5 [EF]   0800 Albumin: 3.5 [EF]   0800 BILIRUBIN TOTAL: 0.7 [EF]   0800 ALP: 147 [EF]   0800 ALT: 17 [EF]   0800 AST: 16 [EF]   0852 49-year-old male presents with left lower extremity swelling for several days after scraping his leg on a piece of wood earlier this week while working on his porch.  He has a left lower extremity cellulitis on exam.  The skin overlying the shin to the left leg is swollen, warm and erythematous.  He has bilateral lower extremity venous stasis changes and leg swelling likely secondary to prolonged periods of standing and p.o. Norvasc.  He can not see primary care for several more months.  Advised him to stop the amlodipine and start lisinopril.  Given IV Dalvance in the ER.  Labs and ultrasound are reassuring.  No systemic complaints.  Well-appearing on discharge.  Return precautions discussed with the patient.  He is able to verbalize these back to me.  He does not need admission at this time but if his swelling and other symptoms do not improve he might need to be admitted.  Advised to take several days off work.  States he can not do this and must go to work.  He states that he can stay in the office and elevate his leg. [EF]      ED Course User Index  [EF] Talha Bobo MD                           Clinical Impression:  Final diagnoses:  [M79.89] Left leg swelling                 Talha Bobo MD  01/10/25 9467

## 2025-01-15 LAB
BACTERIA BLD CULT: NORMAL
BACTERIA BLD CULT: NORMAL